# Patient Record
Sex: MALE | Race: WHITE | Employment: FULL TIME | URBAN - METROPOLITAN AREA
[De-identification: names, ages, dates, MRNs, and addresses within clinical notes are randomized per-mention and may not be internally consistent; named-entity substitution may affect disease eponyms.]

---

## 2017-08-17 DIAGNOSIS — F32.A ANXIETY AND DEPRESSION: ICD-10-CM

## 2017-08-17 DIAGNOSIS — F41.9 ANXIETY AND DEPRESSION: ICD-10-CM

## 2017-08-17 DIAGNOSIS — F32.A DEPRESSION: ICD-10-CM

## 2017-08-17 RX ORDER — ESCITALOPRAM OXALATE 20 MG/1
TABLET ORAL
Qty: 90 TABLET | Refills: 3 | Status: SHIPPED | OUTPATIENT
Start: 2017-08-17 | End: 2018-09-24 | Stop reason: SDUPTHER

## 2017-08-18 DIAGNOSIS — F32.A DEPRESSION: ICD-10-CM

## 2017-08-18 DIAGNOSIS — F41.9 ANXIETY AND DEPRESSION: ICD-10-CM

## 2017-08-18 DIAGNOSIS — F32.A ANXIETY AND DEPRESSION: ICD-10-CM

## 2017-08-18 RX ORDER — ESCITALOPRAM OXALATE 20 MG/1
TABLET ORAL
Qty: 90 TABLET | Refills: 3 | Status: SHIPPED | OUTPATIENT
Start: 2017-08-18 | End: 2017-09-26 | Stop reason: SDUPTHER

## 2017-09-14 ENCOUNTER — TELEPHONE (OUTPATIENT)
Dept: FAMILY MEDICINE CLINIC | Age: 37
End: 2017-09-14

## 2017-09-14 DIAGNOSIS — I10 ESSENTIAL HYPERTENSION: Primary | ICD-10-CM

## 2017-09-14 DIAGNOSIS — E78.5 HYPERLIPIDEMIA WITH TARGET LDL LESS THAN 130: ICD-10-CM

## 2017-09-14 DIAGNOSIS — Z51.81 MEDICATION MONITORING ENCOUNTER: ICD-10-CM

## 2017-09-20 ENCOUNTER — HOSPITAL ENCOUNTER (OUTPATIENT)
Age: 37
Discharge: HOME | End: 2017-09-20
Payer: COMMERCIAL

## 2017-09-20 DIAGNOSIS — E78.5 HYPERLIPIDEMIA WITH TARGET LDL LESS THAN 130: ICD-10-CM

## 2017-09-20 DIAGNOSIS — I10 ESSENTIAL HYPERTENSION: ICD-10-CM

## 2017-09-20 DIAGNOSIS — Z51.81 MEDICATION MONITORING ENCOUNTER: ICD-10-CM

## 2017-09-20 LAB
ALBUMIN SERPL-MCNC: 4.6 G/DL (ref 3.5–5.1)
ALP BLD-CCNC: 56 U/L (ref 38–126)
ALT SERPL-CCNC: 33 U/L (ref 11–66)
ANION GAP SERPL CALCULATED.3IONS-SCNC: 13 MEQ/L (ref 8–16)
AST SERPL-CCNC: 23 U/L (ref 5–40)
BILIRUB SERPL-MCNC: 0.4 MG/DL (ref 0.3–1.2)
BILIRUBIN URINE: NEGATIVE
BLOOD, URINE: NEGATIVE
BUN BLDV-MCNC: 15 MG/DL (ref 7–22)
CALCIUM SERPL-MCNC: 9.3 MG/DL (ref 8.5–10.5)
CHARACTER, URINE: CLEAR
CHLORIDE BLD-SCNC: 102 MEQ/L (ref 98–111)
CHOLESTEROL, TOTAL: 237 MG/DL (ref 100–199)
CO2: 24 MEQ/L (ref 23–33)
COLOR: YELLOW
CREAT SERPL-MCNC: 0.8 MG/DL (ref 0.4–1.2)
GFR SERPL CREATININE-BSD FRML MDRD: > 90 ML/MIN/1.73M2
GLUCOSE BLD-MCNC: 107 MG/DL (ref 70–108)
GLUCOSE, URINE: NEGATIVE MG/DL
HCT VFR BLD CALC: 45.2 % (ref 42–52)
HDLC SERPL-MCNC: 36 MG/DL
HEMOGLOBIN: 15.3 GM/DL (ref 14–18)
KETONES, URINE: NEGATIVE
LDL CHOLESTEROL CALCULATED: 135 MG/DL
LEUKOCYTE EST, POC: NEGATIVE
MCH RBC QN AUTO: 31 PG (ref 27–31)
MCHC RBC AUTO-ENTMCNC: 33.9 GM/DL (ref 33–37)
MCV RBC AUTO: 91.5 FL (ref 80–94)
NITRITE, URINE: NEGATIVE
PDW BLD-RTO: 13.1 % (ref 11.5–14.5)
PH UA: 7.5
PLATELET # BLD: 185 THOU/MM3 (ref 130–400)
PMV BLD AUTO: 8.9 MCM (ref 7.4–10.4)
POTASSIUM SERPL-SCNC: 4.3 MEQ/L (ref 3.5–5.2)
PROTEIN UA: NEGATIVE MG/DL
RBC # BLD: 4.94 MILL/MM3 (ref 4.7–6.1)
SODIUM BLD-SCNC: 139 MEQ/L (ref 135–145)
SPECIFIC GRAVITY UA: 1.02 (ref 1–1.03)
TOTAL PROTEIN: 7.2 G/DL (ref 6.1–8)
TRIGL SERPL-MCNC: 329 MG/DL (ref 0–199)
UROBILINOGEN, URINE: 0.2 EU/DL
WBC # BLD: 6.1 THOU/MM3 (ref 4.8–10.8)

## 2017-09-20 PROCEDURE — 81003 URINALYSIS AUTO W/O SCOPE: CPT

## 2017-09-20 PROCEDURE — 85027 COMPLETE CBC AUTOMATED: CPT

## 2017-09-20 PROCEDURE — 36415 COLL VENOUS BLD VENIPUNCTURE: CPT

## 2017-09-20 PROCEDURE — 80061 LIPID PANEL: CPT

## 2017-09-20 PROCEDURE — 80053 COMPREHEN METABOLIC PANEL: CPT

## 2017-09-26 ENCOUNTER — OFFICE VISIT (OUTPATIENT)
Dept: FAMILY MEDICINE CLINIC | Age: 37
End: 2017-09-26
Payer: COMMERCIAL

## 2017-09-26 VITALS
HEIGHT: 75 IN | DIASTOLIC BLOOD PRESSURE: 82 MMHG | BODY MASS INDEX: 32.03 KG/M2 | RESPIRATION RATE: 16 BRPM | SYSTOLIC BLOOD PRESSURE: 128 MMHG | HEART RATE: 68 BPM | OXYGEN SATURATION: 98 % | WEIGHT: 257.6 LBS

## 2017-09-26 DIAGNOSIS — E78.5 HYPERLIPIDEMIA WITH TARGET LDL LESS THAN 130: ICD-10-CM

## 2017-09-26 DIAGNOSIS — F33.8 SEASONAL AFFECTIVE DISORDER (HCC): ICD-10-CM

## 2017-09-26 DIAGNOSIS — F32.A ANXIETY AND DEPRESSION: ICD-10-CM

## 2017-09-26 DIAGNOSIS — K21.9 GASTROESOPHAGEAL REFLUX DISEASE WITHOUT ESOPHAGITIS: ICD-10-CM

## 2017-09-26 DIAGNOSIS — I10 ESSENTIAL HYPERTENSION: Primary | ICD-10-CM

## 2017-09-26 DIAGNOSIS — Z51.81 MEDICATION MONITORING ENCOUNTER: ICD-10-CM

## 2017-09-26 DIAGNOSIS — F41.9 ANXIETY AND DEPRESSION: ICD-10-CM

## 2017-09-26 DIAGNOSIS — J30.1 SEASONAL ALLERGIC RHINITIS DUE TO POLLEN: ICD-10-CM

## 2017-09-26 PROCEDURE — 99214 OFFICE O/P EST MOD 30 MIN: CPT | Performed by: FAMILY MEDICINE

## 2017-09-26 RX ORDER — RAMIPRIL 5 MG/1
5 CAPSULE ORAL DAILY
Qty: 90 CAPSULE | Refills: 3 | Status: SHIPPED | OUTPATIENT
Start: 2017-09-26 | End: 2018-09-24 | Stop reason: SDUPTHER

## 2017-09-26 RX ORDER — OMEPRAZOLE 20 MG/1
20 CAPSULE, DELAYED RELEASE ORAL DAILY
Qty: 90 CAPSULE | Refills: 3 | Status: SHIPPED | OUTPATIENT
Start: 2017-09-26 | End: 2018-09-24 | Stop reason: SDUPTHER

## 2017-09-26 RX ORDER — HYDROXYZINE HYDROCHLORIDE 25 MG/1
25 TABLET, FILM COATED ORAL EVERY 8 HOURS PRN
Qty: 30 TABLET | Refills: 2 | Status: SHIPPED | OUTPATIENT
Start: 2017-09-26 | End: 2018-09-18 | Stop reason: SDUPTHER

## 2017-09-26 ASSESSMENT — PATIENT HEALTH QUESTIONNAIRE - PHQ9
2. FEELING DOWN, DEPRESSED OR HOPELESS: 0
1. LITTLE INTEREST OR PLEASURE IN DOING THINGS: 0
SUM OF ALL RESPONSES TO PHQ QUESTIONS 1-9: 0
SUM OF ALL RESPONSES TO PHQ9 QUESTIONS 1 & 2: 0

## 2017-09-26 ASSESSMENT — ENCOUNTER SYMPTOMS
GASTROINTESTINAL NEGATIVE: 1
RHINORRHEA: 0
RESPIRATORY NEGATIVE: 1
SINUS PRESSURE: 0
FACIAL SWELLING: 0
COUGH: 0

## 2017-10-30 RX ORDER — OMEPRAZOLE 20 MG/1
CAPSULE, DELAYED RELEASE ORAL
Qty: 90 CAPSULE | Refills: 3 | Status: SHIPPED | OUTPATIENT
Start: 2017-10-30 | End: 2018-09-24 | Stop reason: SDUPTHER

## 2018-05-06 ENCOUNTER — HOSPITAL ENCOUNTER (EMERGENCY)
Age: 38
Discharge: HOME | End: 2018-05-06
Payer: COMMERCIAL

## 2018-05-06 VITALS
WEIGHT: 280 LBS | HEART RATE: 65 BPM | HEIGHT: 75 IN | RESPIRATION RATE: 20 BRPM | BODY MASS INDEX: 34.82 KG/M2 | TEMPERATURE: 98 F | OXYGEN SATURATION: 98 % | DIASTOLIC BLOOD PRESSURE: 70 MMHG | SYSTOLIC BLOOD PRESSURE: 137 MMHG

## 2018-05-06 DIAGNOSIS — M79.5 FOREIGN BODY (FB) IN SOFT TISSUE: Primary | ICD-10-CM

## 2018-05-06 PROCEDURE — 6370000000 HC RX 637 (ALT 250 FOR IP): Performed by: PHYSICIAN ASSISTANT

## 2018-05-06 PROCEDURE — 99282 EMERGENCY DEPT VISIT SF MDM: CPT

## 2018-05-06 PROCEDURE — 90715 TDAP VACCINE 7 YRS/> IM: CPT | Performed by: PHYSICIAN ASSISTANT

## 2018-05-06 PROCEDURE — 6360000002 HC RX W HCPCS: Performed by: PHYSICIAN ASSISTANT

## 2018-05-06 PROCEDURE — 90471 IMMUNIZATION ADMIN: CPT | Performed by: PHYSICIAN ASSISTANT

## 2018-05-06 RX ORDER — CEPHALEXIN 500 MG/1
500 CAPSULE ORAL 4 TIMES DAILY
Qty: 28 CAPSULE | Refills: 0 | Status: SHIPPED | OUTPATIENT
Start: 2018-05-06 | End: 2018-05-13

## 2018-05-06 RX ORDER — IBUPROFEN 200 MG
TABLET ORAL ONCE
Status: COMPLETED | OUTPATIENT
Start: 2018-05-06 | End: 2018-05-06

## 2018-05-06 RX ORDER — LIDOCAINE HYDROCHLORIDE 10 MG/ML
INJECTION, SOLUTION INFILTRATION; PERINEURAL
Status: DISCONTINUED
Start: 2018-05-06 | End: 2018-05-06 | Stop reason: HOSPADM

## 2018-05-06 RX ADMIN — NEOMYCIN AND POLYMYXIN B SULFATES AND BACITRACIN ZINC: 400; 3.5; 5 OINTMENT TOPICAL at 15:34

## 2018-05-06 RX ADMIN — TETANUS TOXOID, REDUCED DIPHTHERIA TOXOID AND ACELLULAR PERTUSSIS VACCINE, ADSORBED 0.5 ML: 5; 2.5; 8; 8; 2.5 SUSPENSION INTRAMUSCULAR at 15:30

## 2018-05-06 ASSESSMENT — ENCOUNTER SYMPTOMS
NAUSEA: 0
VOMITING: 0
DIARRHEA: 0
BACK PAIN: 0
ABDOMINAL PAIN: 0
WHEEZING: 0
EYE REDNESS: 0
COUGH: 0
SHORTNESS OF BREATH: 0
SORE THROAT: 0
RHINORRHEA: 0
EYE DISCHARGE: 0

## 2018-05-06 ASSESSMENT — PAIN DESCRIPTION - PAIN TYPE: TYPE: ACUTE PAIN

## 2018-05-06 ASSESSMENT — PAIN DESCRIPTION - ORIENTATION: ORIENTATION: RIGHT

## 2018-05-06 ASSESSMENT — PAIN DESCRIPTION - DESCRIPTORS: DESCRIPTORS: ACHING

## 2018-05-06 ASSESSMENT — PAIN SCALES - GENERAL: PAINLEVEL_OUTOF10: 2

## 2018-09-19 RX ORDER — HYDROXYZINE HYDROCHLORIDE 25 MG/1
25 TABLET, FILM COATED ORAL EVERY 8 HOURS PRN
Qty: 30 TABLET | Refills: 2 | Status: SHIPPED | OUTPATIENT
Start: 2018-09-19 | End: 2022-04-05 | Stop reason: SDUPTHER

## 2018-09-22 DIAGNOSIS — F32.A DEPRESSION: ICD-10-CM

## 2018-09-22 DIAGNOSIS — F41.9 ANXIETY AND DEPRESSION: ICD-10-CM

## 2018-09-22 DIAGNOSIS — F32.A ANXIETY AND DEPRESSION: ICD-10-CM

## 2018-09-24 ENCOUNTER — OFFICE VISIT (OUTPATIENT)
Dept: FAMILY MEDICINE CLINIC | Age: 38
End: 2018-09-24
Payer: COMMERCIAL

## 2018-09-24 VITALS
SYSTOLIC BLOOD PRESSURE: 124 MMHG | HEART RATE: 76 BPM | WEIGHT: 272.9 LBS | RESPIRATION RATE: 16 BRPM | OXYGEN SATURATION: 99 % | DIASTOLIC BLOOD PRESSURE: 76 MMHG | HEIGHT: 74 IN | BODY MASS INDEX: 35.02 KG/M2

## 2018-09-24 DIAGNOSIS — E78.5 HYPERLIPIDEMIA WITH TARGET LDL LESS THAN 130: ICD-10-CM

## 2018-09-24 DIAGNOSIS — J30.1 SEASONAL ALLERGIC RHINITIS DUE TO POLLEN: ICD-10-CM

## 2018-09-24 DIAGNOSIS — I10 ESSENTIAL HYPERTENSION: Primary | ICD-10-CM

## 2018-09-24 DIAGNOSIS — K21.9 GASTROESOPHAGEAL REFLUX DISEASE WITHOUT ESOPHAGITIS: ICD-10-CM

## 2018-09-24 DIAGNOSIS — F32.A ANXIETY AND DEPRESSION: ICD-10-CM

## 2018-09-24 DIAGNOSIS — F41.9 ANXIETY AND DEPRESSION: ICD-10-CM

## 2018-09-24 DIAGNOSIS — F33.8 SEASONAL AFFECTIVE DISORDER (HCC): ICD-10-CM

## 2018-09-24 DIAGNOSIS — Z51.81 MEDICATION MONITORING ENCOUNTER: ICD-10-CM

## 2018-09-24 PROCEDURE — 99214 OFFICE O/P EST MOD 30 MIN: CPT | Performed by: FAMILY MEDICINE

## 2018-09-24 RX ORDER — ESCITALOPRAM OXALATE 20 MG/1
20 TABLET ORAL DAILY
Qty: 90 TABLET | Refills: 3 | Status: SHIPPED | OUTPATIENT
Start: 2018-09-24 | End: 2019-08-08 | Stop reason: SDUPTHER

## 2018-09-24 RX ORDER — ESCITALOPRAM OXALATE 20 MG/1
TABLET ORAL
Qty: 90 TABLET | Refills: 0 | Status: SHIPPED | OUTPATIENT
Start: 2018-09-24 | End: 2018-09-24 | Stop reason: SDUPTHER

## 2018-09-24 RX ORDER — OMEPRAZOLE 20 MG/1
20 CAPSULE, DELAYED RELEASE ORAL DAILY
Qty: 90 CAPSULE | Refills: 3 | Status: SHIPPED | OUTPATIENT
Start: 2018-09-24 | End: 2019-08-08 | Stop reason: SDUPTHER

## 2018-09-24 RX ORDER — RAMIPRIL 5 MG/1
5 CAPSULE ORAL DAILY
Qty: 90 CAPSULE | Refills: 3 | Status: SHIPPED | OUTPATIENT
Start: 2018-09-24 | End: 2019-08-08 | Stop reason: SDUPTHER

## 2018-09-24 ASSESSMENT — ENCOUNTER SYMPTOMS
GASTROINTESTINAL NEGATIVE: 1
COUGH: 0
ALLERGIC/IMMUNOLOGIC NEGATIVE: 1
RESPIRATORY NEGATIVE: 1

## 2018-09-24 NOTE — PROGRESS NOTES
Chronic Disease Visit Information    BP Readings from Last 3 Encounters:   05/06/18 137/70   09/26/17 128/82   05/24/16 122/66          Hemoglobin A1C (%)   Date Value   09/30/2013 5.7     LDL Calculated (mg/dL)   Date Value   09/20/2017 135     HDL (mg/dL)   Date Value   09/20/2017 36     BUN (mg/dL)   Date Value   09/20/2017 15     CREATININE (mg/dL)   Date Value   09/20/2017 0.8     Glucose (mg/dL)   Date Value   09/20/2017 107            Have you changed or started any medications since your last visit including any over-the-counter medicines, vitamins, or herbal medicines? Yes, medication list updated  Are you having any side effects from any of your medications? -  no  Have you stopped taking any of your medications? Is so, why? -  no    Have you seen any other physician or provider since your last visit? No  Have you had any other diagnostic tests since your last visit? No  Have you been seen in the emergency room and/or had an admission to a hospital since we last saw you? Yes - Records Obtained  Have you had your annual diabetic retinal (eye) exam? No  Have you had your routine dental cleaning in the past 6 months? no    Have you activated your [employer] account? If not, what are your barriers?  No: not at this time     Patient Care Team:  Sharyle Settle, MD as PCP - General (Family Medicine)         Medical History Review  Past Medical, Family, and Social History reviewed and does not contribute to the patient presenting condition    Health Maintenance   Topic Date Due    HIV screen  12/03/1995    A1C test (Diabetic or Prediabetic)  09/30/2014    Flu vaccine (1) 09/01/2018    Potassium monitoring  09/20/2018    Creatinine monitoring  09/20/2018    DTaP/Tdap/Td vaccine (2 - Td) 05/06/2028

## 2018-09-24 NOTE — PROGRESS NOTES
Subjective:      Patient ID: Shannon Munson is a 40 y.o. male. HPI  Follow up of chronic conditions. Encounter Diagnoses   Name Primary?  Anxiety and depression     Essential hypertension Yes    Gastroesophageal reflux disease without esophagitis     Seasonal allergic rhinitis due to pollen     Medication monitoring encounter     Hyperlipidemia with target LDL less than 130     Seasonal affective disorder (HCC)      HTN is stable with current medications. Pt has no medication side effects nor orthostatic symptoms. BP Readings from Last 3 Encounters:   09/24/18 124/76   05/06/18 137/70   09/26/17 128/82     His gastroesophageal reflux still bothers him. He is trying to wean himself off of his Prilosec because of the adverse potential side effects with long-term use. .  We discussed several options of trying to get him onto an H2 blocker with gradual tapering of the Prilosec going to either Pepcid or Zantac over-the-counter. He realizes that those aren't necessarily completely benign but is hopeful that they will decrease the chances of cardiac, liver or renal injury. He has also found that there are certain foods and beverages that he is having to stop using and has found that soft drinks and pop definitely aggravate his gastroesophageal reflux. The rationale behind stopping them including diet free sodas was discussed. His mood has improved since he is decided to do more activities outdoors. He is now hunting and fishing routinely and is starting to engage in winter sports. This has improved his seasonal affective disorder markedly and he plans on doing this indefinitely. His seasonal allergies were not as severe this year as previous years. He has only had to use over-the-counter antihistamines as needed based upon the severity of symptoms.   He is taking his montelukast asked as needed and through the summer months, he was at least taking loratadine on a regular basis for the mid release capsule Take 1 capsule by mouth Daily 90 capsule 3    escitalopram (LEXAPRO) 20 MG tablet Take 1 tablet by mouth daily 90 tablet 3    hydrOXYzine (ATARAX) 25 MG tablet TAKE 1 TABLET BY MOUTH EVERY 8 HOURS AS NEEDED FOR ITCHING 30 tablet 2    loratadine (CLARITIN) 10 MG tablet Take 10 mg by mouth daily. No current facility-administered medications for this visit. Continue present medications. Try changing to Pepcid or Zantac from the Prilosec as able. Continue outdoor activities to help mood. Jacinta Nunez received counseling on the following healthy behaviors: nutrition and exercise  Reviewed prior labs and health maintenance  Continue current medications, diet and exercise. Discussed use, benefit, and side effects of prescribed medications. Barriers to medication compliance addressed. Patient given educational materials - see patient instructions  Was a self-tracking handout given in paper form or via [employer]? Yes    Requested Prescriptions     Signed Prescriptions Disp Refills    ramipril (ALTACE) 5 MG capsule 90 capsule 3     Sig: Take 1 capsule by mouth daily    omeprazole (PRILOSEC) 20 MG delayed release capsule 90 capsule 3     Sig: Take 1 capsule by mouth Daily    escitalopram (LEXAPRO) 20 MG tablet 90 tablet 3     Sig: Take 1 tablet by mouth daily       All patient questions answered. Patient voiced understanding. Quality Measures    Body mass index is 35.04 kg/m². Elevated. Weight control planned discussed Healthy diet and regular exercise. BP: 124/76 Blood pressure is normal. Treatment plan consists of No treatment change needed.     Lab Results   Component Value Date    LDLCALC 135 09/20/2017    (goal LDL reduction with dx if diabetes is 50% LDL reduction)      PHQ Scores 9/26/2017   PHQ2 Score 0   PHQ9 Score 0     Interpretation of Total Score Depression Severity: 1-4 = Minimal depression, 5-9 = Mild depression, 10-14 = Moderate depression, 15-19 = Moderately severe depression, 20-27 = Severe depression          Carol Mcdonald MD

## 2018-09-25 ENCOUNTER — TELEPHONE (OUTPATIENT)
Dept: FAMILY MEDICINE CLINIC | Age: 38
End: 2018-09-25

## 2019-08-08 ENCOUNTER — OFFICE VISIT (OUTPATIENT)
Dept: FAMILY MEDICINE CLINIC | Age: 39
End: 2019-08-08
Payer: COMMERCIAL

## 2019-08-08 VITALS
BODY MASS INDEX: 33.33 KG/M2 | HEART RATE: 67 BPM | WEIGHT: 259.6 LBS | OXYGEN SATURATION: 98 % | DIASTOLIC BLOOD PRESSURE: 74 MMHG | RESPIRATION RATE: 13 BRPM | SYSTOLIC BLOOD PRESSURE: 116 MMHG

## 2019-08-08 DIAGNOSIS — B07.8 PERIUNGUAL WART: ICD-10-CM

## 2019-08-08 DIAGNOSIS — F32.A ANXIETY AND DEPRESSION: ICD-10-CM

## 2019-08-08 DIAGNOSIS — Z00.00 WELL ADULT EXAM: Primary | ICD-10-CM

## 2019-08-08 DIAGNOSIS — F41.9 ANXIETY AND DEPRESSION: ICD-10-CM

## 2019-08-08 DIAGNOSIS — I10 ESSENTIAL HYPERTENSION: ICD-10-CM

## 2019-08-08 DIAGNOSIS — L84 CALLUS OF PERIWOUND SKIN: ICD-10-CM

## 2019-08-08 DIAGNOSIS — K21.9 GASTROESOPHAGEAL REFLUX DISEASE WITHOUT ESOPHAGITIS: ICD-10-CM

## 2019-08-08 PROCEDURE — 99395 PREV VISIT EST AGE 18-39: CPT | Performed by: NURSE PRACTITIONER

## 2019-08-08 RX ORDER — ESCITALOPRAM OXALATE 20 MG/1
20 TABLET ORAL DAILY
Qty: 90 TABLET | Refills: 3 | Status: SHIPPED | OUTPATIENT
Start: 2019-08-08 | End: 2020-08-18 | Stop reason: SDUPTHER

## 2019-08-08 RX ORDER — OMEPRAZOLE 20 MG/1
20 CAPSULE, DELAYED RELEASE ORAL DAILY
Qty: 90 CAPSULE | Refills: 3 | Status: SHIPPED | OUTPATIENT
Start: 2019-08-08 | End: 2020-08-18 | Stop reason: SDUPTHER

## 2019-08-08 RX ORDER — RAMIPRIL 5 MG/1
5 CAPSULE ORAL DAILY
Qty: 90 CAPSULE | Refills: 3 | Status: SHIPPED | OUTPATIENT
Start: 2019-08-08 | End: 2020-08-18 | Stop reason: SDUPTHER

## 2019-08-08 RX ORDER — IMIQUIMOD 12.5 MG/.25G
CREAM TOPICAL
Qty: 24 EACH | Refills: 0 | Status: SHIPPED | OUTPATIENT
Start: 2019-08-08 | End: 2019-08-15

## 2019-08-08 ASSESSMENT — ENCOUNTER SYMPTOMS
SHORTNESS OF BREATH: 0
SORE THROAT: 0
COUGH: 0

## 2019-09-07 LAB
CHOLESTEROL, TOTAL: 219 MG/DL
CHOLESTEROL/HDL RATIO: NORMAL
HDLC SERPL-MCNC: 39 MG/DL (ref 35–70)
LDL CHOLESTEROL CALCULATED: 143 MG/DL (ref 0–160)
TRIGL SERPL-MCNC: 184 MG/DL
VLDLC SERPL CALC-MCNC: 37 MG/DL

## 2019-11-04 ENCOUNTER — OFFICE VISIT (OUTPATIENT)
Dept: FAMILY MEDICINE CLINIC | Age: 39
End: 2019-11-04
Payer: COMMERCIAL

## 2019-11-04 VITALS
HEIGHT: 75 IN | RESPIRATION RATE: 18 BRPM | SYSTOLIC BLOOD PRESSURE: 116 MMHG | HEART RATE: 68 BPM | WEIGHT: 257.2 LBS | BODY MASS INDEX: 31.98 KG/M2 | DIASTOLIC BLOOD PRESSURE: 78 MMHG

## 2019-11-04 DIAGNOSIS — M75.22 BICEPS TENDINITIS OF LEFT UPPER EXTREMITY: Primary | ICD-10-CM

## 2019-11-04 PROCEDURE — 99213 OFFICE O/P EST LOW 20 MIN: CPT | Performed by: FAMILY MEDICINE

## 2019-11-04 RX ORDER — MELOXICAM 15 MG/1
15 TABLET ORAL DAILY
Qty: 30 TABLET | Refills: 3 | Status: SHIPPED | OUTPATIENT
Start: 2019-11-04 | End: 2020-03-06

## 2020-03-06 RX ORDER — MELOXICAM 15 MG/1
TABLET ORAL
Qty: 30 TABLET | Refills: 3 | Status: SHIPPED | OUTPATIENT
Start: 2020-03-06 | End: 2020-07-14 | Stop reason: ALTCHOICE

## 2020-07-14 ENCOUNTER — TELEPHONE (OUTPATIENT)
Dept: FAMILY MEDICINE CLINIC | Age: 40
End: 2020-07-14

## 2020-07-14 ENCOUNTER — OFFICE VISIT (OUTPATIENT)
Dept: FAMILY MEDICINE CLINIC | Age: 40
End: 2020-07-14
Payer: COMMERCIAL

## 2020-07-14 VITALS
SYSTOLIC BLOOD PRESSURE: 126 MMHG | OXYGEN SATURATION: 98 % | DIASTOLIC BLOOD PRESSURE: 63 MMHG | WEIGHT: 247.8 LBS | RESPIRATION RATE: 16 BRPM | TEMPERATURE: 97.7 F | BODY MASS INDEX: 31.8 KG/M2 | HEART RATE: 64 BPM | HEIGHT: 74 IN

## 2020-07-14 PROCEDURE — 99213 OFFICE O/P EST LOW 20 MIN: CPT | Performed by: FAMILY MEDICINE

## 2020-07-14 RX ORDER — NAPROXEN 500 MG/1
500 TABLET ORAL 2 TIMES DAILY WITH MEALS
Qty: 30 TABLET | Refills: 0 | Status: SHIPPED | OUTPATIENT
Start: 2020-07-14 | End: 2021-03-09

## 2020-07-14 ASSESSMENT — ENCOUNTER SYMPTOMS
GASTROINTESTINAL NEGATIVE: 1
RESPIRATORY NEGATIVE: 1

## 2020-07-14 NOTE — TELEPHONE ENCOUNTER
Pt is scheduled for a physical on 8/18/20. He is requesting routine lab orders, including a PSA. Please mail orders to pt prior to his appt.

## 2020-07-14 NOTE — PATIENT INSTRUCTIONS
You may receive a survey about your visit with us today. The feedback from our patients helps us identify what is working well and where the service to all patients can be enhanced. Thank you! Patient Education        Epididymitis and Orchitis: Care Instructions  Your Care Instructions     Epididymitis is pain and swelling of the tube that is attached to each testicle. This tube is called the epididymis. Orchitis is pain and swelling of the testicle. Infection with bacteria often causes these conditions. Sexually transmitted infections (STIs) also can cause both conditions. This is often the case in men younger than 28. Other causes in boys and older men are infections from surgery or having a catheter that drains urine. The mumps virus also can cause orchitis. Anti-inflammatory or pain medicines can help with the pain. Antibiotics are used if the problem is caused by bacteria. They are not used if a virus is the cause. Your testicle may stay swollen for many days or even a few weeks. The doctor has checked you carefully, but problems can develop later. If you notice any problems or new symptoms, get medical treatment right away. Follow-up care is a key part of your treatment and safety. Be sure to make and go to all appointments, and call your doctor if you are having problems. It's also a good idea to know your test results and keep a list of the medicines you take. How can you care for yourself at home? · If your doctor prescribed antibiotics, take them as directed. Do not stop taking them just because you feel better. You need to take the full course of antibiotics. · Ask your doctor if you can take an over-the-counter pain medicine, such as acetaminophen (Tylenol), ibuprofen (Advil, Motrin), or naproxen (Aleve). Be safe with medicines. Read and follow all instructions on the label. · Limit your activity to what is comfortable. · Wear snug underwear or an athletic supporter.  This can help reduce

## 2020-07-14 NOTE — PROGRESS NOTES
reviewed and are negative. Objective:   Physical Exam  Vitals signs and nursing note reviewed. Constitutional:       Appearance: He is well-developed. HENT:      Head: Normocephalic and atraumatic. Right Ear: Tympanic membrane normal.      Left Ear: Tympanic membrane normal.   Cardiovascular:      Rate and Rhythm: Normal rate and regular rhythm. Heart sounds: Normal heart sounds. No murmur. Pulmonary:      Effort: Pulmonary effort is normal.      Breath sounds: Normal breath sounds. Abdominal:      General: Bowel sounds are normal.      Palpations: Abdomen is soft. Hernia: There is no hernia in the left inguinal area or right inguinal area. Genitourinary:     Pubic Area: No rash. Penis: Normal and circumcised. Scrotum/Testes:         Right: Mass, tenderness, testicular hydrocele or varicocele not present. Left: Mass, tenderness, testicular hydrocele or varicocele not present. Epididymis:      Right: Inflamed. Tenderness present. Lymphadenopathy:      Lower Body: No right inguinal adenopathy. No left inguinal adenopathy. Skin:     General: Skin is warm and dry. Neurological:      Mental Status: He is alert and oriented to person, place, and time. Psychiatric:         Behavior: Behavior normal.         Assessment:       Diagnosis Orders   1.  Epididymitis, right  naproxen (NAPROSYN) 500 MG tablet           Plan:      -  Naproxen BID  -  Supportive underwear  -  RTO prn        Viet Walton DO

## 2020-08-18 ENCOUNTER — OFFICE VISIT (OUTPATIENT)
Dept: FAMILY MEDICINE CLINIC | Age: 40
End: 2020-08-18
Payer: COMMERCIAL

## 2020-08-18 ENCOUNTER — NURSE ONLY (OUTPATIENT)
Dept: LAB | Age: 40
End: 2020-08-18

## 2020-08-18 VITALS
HEART RATE: 80 BPM | WEIGHT: 243.7 LBS | TEMPERATURE: 97.1 F | SYSTOLIC BLOOD PRESSURE: 130 MMHG | DIASTOLIC BLOOD PRESSURE: 74 MMHG | HEIGHT: 75 IN | BODY MASS INDEX: 30.3 KG/M2 | RESPIRATION RATE: 16 BRPM

## 2020-08-18 LAB
ALBUMIN SERPL-MCNC: 4.7 G/DL (ref 3.5–5.1)
ALP BLD-CCNC: 46 U/L (ref 38–126)
ALT SERPL-CCNC: 21 U/L (ref 11–66)
ANION GAP SERPL CALCULATED.3IONS-SCNC: 13 MEQ/L (ref 8–16)
AST SERPL-CCNC: 21 U/L (ref 5–40)
BASOPHILS # BLD: 0.6 %
BASOPHILS ABSOLUTE: 0 THOU/MM3 (ref 0–0.1)
BILIRUB SERPL-MCNC: 0.4 MG/DL (ref 0.3–1.2)
BUN BLDV-MCNC: 23 MG/DL (ref 7–22)
CALCIUM SERPL-MCNC: 9.5 MG/DL (ref 8.5–10.5)
CHLORIDE BLD-SCNC: 106 MEQ/L (ref 98–111)
CHOLESTEROL, FASTING: 216 MG/DL (ref 100–199)
CO2: 22 MEQ/L (ref 23–33)
CREAT SERPL-MCNC: 0.8 MG/DL (ref 0.4–1.2)
EOSINOPHIL # BLD: 1.1 %
EOSINOPHILS ABSOLUTE: 0.1 THOU/MM3 (ref 0–0.4)
ERYTHROCYTE [DISTWIDTH] IN BLOOD BY AUTOMATED COUNT: 12.6 % (ref 11.5–14.5)
ERYTHROCYTE [DISTWIDTH] IN BLOOD BY AUTOMATED COUNT: 45.1 FL (ref 35–45)
GFR SERPL CREATININE-BSD FRML MDRD: > 90 ML/MIN/1.73M2
GLUCOSE FASTING: 106 MG/DL (ref 70–108)
HCT VFR BLD CALC: 44.8 % (ref 42–52)
HDLC SERPL-MCNC: 36 MG/DL
HEMOGLOBIN: 14.9 GM/DL (ref 14–18)
IMMATURE GRANS (ABS): 0.02 THOU/MM3 (ref 0–0.07)
IMMATURE GRANULOCYTES: 0.3 %
LDL CHOLESTEROL CALCULATED: 156 MG/DL
LYMPHOCYTES # BLD: 26 %
LYMPHOCYTES ABSOLUTE: 1.8 THOU/MM3 (ref 1–4.8)
MCH RBC QN AUTO: 32.2 PG (ref 26–33)
MCHC RBC AUTO-ENTMCNC: 33.3 GM/DL (ref 32.2–35.5)
MCV RBC AUTO: 96.8 FL (ref 80–94)
MONOCYTES # BLD: 7.9 %
MONOCYTES ABSOLUTE: 0.6 THOU/MM3 (ref 0.4–1.3)
NUCLEATED RED BLOOD CELLS: 0 /100 WBC
PLATELET # BLD: 206 THOU/MM3 (ref 130–400)
PMV BLD AUTO: 11.2 FL (ref 9.4–12.4)
POTASSIUM SERPL-SCNC: 3.8 MEQ/L (ref 3.5–5.2)
PROSTATE SPECIFIC ANTIGEN: 0.51 NG/ML (ref 0–1)
RBC # BLD: 4.63 MILL/MM3 (ref 4.7–6.1)
SEG NEUTROPHILS: 64.1 %
SEGMENTED NEUTROPHILS ABSOLUTE COUNT: 4.5 THOU/MM3 (ref 1.8–7.7)
SODIUM BLD-SCNC: 141 MEQ/L (ref 135–145)
TOTAL PROTEIN: 7.2 G/DL (ref 6.1–8)
TRIGLYCERIDE, FASTING: 119 MG/DL (ref 0–199)
WBC # BLD: 7 THOU/MM3 (ref 4.8–10.8)

## 2020-08-18 PROCEDURE — 99214 OFFICE O/P EST MOD 30 MIN: CPT | Performed by: FAMILY MEDICINE

## 2020-08-18 RX ORDER — RAMIPRIL 5 MG/1
5 CAPSULE ORAL DAILY
Qty: 90 CAPSULE | Refills: 3 | Status: SHIPPED | OUTPATIENT
Start: 2020-08-18 | End: 2021-08-24 | Stop reason: SDUPTHER

## 2020-08-18 RX ORDER — OMEPRAZOLE 20 MG/1
20 CAPSULE, DELAYED RELEASE ORAL DAILY
Qty: 90 CAPSULE | Refills: 3 | Status: SHIPPED | OUTPATIENT
Start: 2020-08-18 | End: 2020-09-15 | Stop reason: ALTCHOICE

## 2020-08-18 RX ORDER — DOXYCYCLINE HYCLATE 100 MG
100 TABLET ORAL 2 TIMES DAILY
Qty: 20 TABLET | Refills: 0 | Status: SHIPPED | OUTPATIENT
Start: 2020-08-18 | End: 2020-08-28

## 2020-08-18 RX ORDER — ESCITALOPRAM OXALATE 20 MG/1
20 TABLET ORAL DAILY
Qty: 90 TABLET | Refills: 3 | Status: SHIPPED | OUTPATIENT
Start: 2020-08-18 | End: 2020-10-05

## 2020-08-18 ASSESSMENT — ENCOUNTER SYMPTOMS
GASTROINTESTINAL NEGATIVE: 1
ALLERGIC/IMMUNOLOGIC NEGATIVE: 1
RESPIRATORY NEGATIVE: 1

## 2020-08-18 NOTE — LETTER
[address] [address]  Phone: [phone]  Fax: [phone]    Petra Narayanan MD        August 18, 2020    [address] [address]      To whom it may concern,    Tiffany Reyes continues to do well on his current blood pressure medications as well as his meds for depression. 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 If you have any questions or concerns, please don't hesitate to call.     Sincerely,        Petra Narayanan MD

## 2020-08-18 NOTE — PROGRESS NOTES
Subjective:      Patient ID: Cindy Bahena is a 44 y.o. male. HPI    Encounter Diagnoses   Name Primary?  Anxiety and depression     Gastroesophageal reflux disease without esophagitis     Essential hypertension Yes    Hyperlipidemia with target LDL less than 130     Medication monitoring encounter     Epididymitis, right      HTN is stable with current medications. Pt has no medication side effects nor orthostatic symptoms. BP Readings from Last 3 Encounters:   08/18/20 130/74   07/14/20 126/63   11/04/19 116/78     Lipid values continue to be good without statin therapy. Cholesterol, Total (mg/dL)   Date Value   09/07/2019 219     HDL (mg/dL)   Date Value   08/18/2020 36     Triglycerides (mg/dL)   Date Value   09/07/2019 184     Lab Results   Component Value Date    LDLCALC 156 08/18/2020     His anxiety/depression continues to respond well to Lexapro 20 mg daily. He has no excessive daytime drowsiness or cognitive changes associated with use of the Lexapro. Gastroesophageal reflux continues to respond to omeprazole 20 mg daily. He has no breakthrough symptoms of reflux day or night. He continues to deal with some mild discomfort in the right testicle posterior aspect associated with epididymitis diagnosed several weeks ago. This seemed to have started after being struck in the groin by a hard \"wiffle ball\" during a game. The aching has not subsided even after using anti-inflammatory agents. He denies dysuria hematuria or pyuria and is simply bothered by the persistent slight swelling and aching of the epididymis. Because of the longevity of the symptoms, I will empirically start him on doxycycline continuing with his anti-inflammatory agents. The rest of this patient's conditions are stable. Past medical and surgical hx reviewed. No past medical history on file. No past surgical history on file.   Portions of this note were completed with a voice recording program. Efforts were made to edit the dictations but occasionally words are mis-transcribed. Review of Systems   Constitutional: Negative. HENT: Negative. Respiratory: Negative. Cardiovascular: Negative. Gastrointestinal: Negative. Genitourinary: Negative. Musculoskeletal: Negative. Allergic/Immunologic: Negative. Neurological: Negative. Hematological: Negative. All other systems reviewed and are negative. Objective:   Physical Exam  Vitals signs and nursing note reviewed. HENT:      Right Ear: Tympanic membrane, ear canal and external ear normal.      Left Ear: Tympanic membrane, ear canal and external ear normal.      Mouth/Throat:      Mouth: Mucous membranes are moist.      Pharynx: Oropharynx is clear. Eyes:      Conjunctiva/sclera: Conjunctivae normal.      Pupils: Pupils are equal, round, and reactive to light. Neck:      Vascular: No carotid bruit. Cardiovascular:      Rate and Rhythm: Normal rate and regular rhythm. Pulses: Normal pulses. Heart sounds: Normal heart sounds. Pulmonary:      Effort: Pulmonary effort is normal.      Breath sounds: Normal breath sounds. Abdominal:      General: Bowel sounds are normal.      Hernia: There is no hernia in the left inguinal area or right inguinal area. Genitourinary:     Penis: Normal and circumcised. Scrotum/Testes: Normal.         Right: Mass not present. Left: Mass not present. Epididymis:      Right: Tenderness present. Left: Normal.   Musculoskeletal: Normal range of motion. Lymphadenopathy:      Lower Body: No right inguinal adenopathy. No left inguinal adenopathy. Skin:     General: Skin is warm and dry. Capillary Refill: Capillary refill takes less than 2 seconds. Neurological:      General: No focal deficit present. Mental Status: He is alert and oriented to person, place, and time.    Psychiatric:         Attention and Perception: Attention normal.         Mood and Affect: Mood normal.         Speech: Speech normal.         Behavior: Behavior normal.         Assessment:       Diagnosis Orders   1. Essential hypertension  ramipril (ALTACE) 5 MG capsule   2. Anxiety and depression  escitalopram (LEXAPRO) 20 MG tablet   3. Gastroesophageal reflux disease without esophagitis  omeprazole (PRILOSEC) 20 MG delayed release capsule   4. Hyperlipidemia with target LDL less than 130     5. Medication monitoring encounter     6. Epididymitis, right  doxycycline hyclate (VIBRA-TABS) 100 MG tablet           Plan:      No orders of the defined types were placed in this encounter. Medications Discontinued During This Encounter   Medication Reason    escitalopram (LEXAPRO) 20 MG tablet REORDER    omeprazole (PRILOSEC) 20 MG delayed release capsule REORDER    ramipril (ALTACE) 5 MG capsule REORDER     Current Outpatient Medications   Medication Sig Dispense Refill    escitalopram (LEXAPRO) 20 MG tablet Take 1 tablet by mouth daily 90 tablet 3    omeprazole (PRILOSEC) 20 MG delayed release capsule Take 1 capsule by mouth Daily 90 capsule 3    ramipril (ALTACE) 5 MG capsule Take 1 capsule by mouth daily 90 capsule 3    doxycycline hyclate (VIBRA-TABS) 100 MG tablet Take 1 tablet by mouth 2 times daily for 10 days 20 tablet 0    naproxen (NAPROSYN) 500 MG tablet Take 1 tablet by mouth 2 times daily (with meals) 30 tablet 0    Montelukast Sodium (SINGULAIR PO) Take by mouth As needed      loratadine (CLARITIN) 10 MG tablet Take 10 mg by mouth daily As needed       No current facility-administered medications for this visit. Continue present medications. Start the antibiotic today. Continue mobic for inflammation. If testicle pain is persistent after the antibiotics, call office for US of testicles.     Coby Monzon received counseling on the following healthy behaviors: nutrition    Patient given educational materials on Hypertension    I have instructed Coby Monzon to complete a self tracking handout on Blood Pressures  and instructed them to bring it with them to his next appointment. Discussed use, benefit, and side effects of prescribed medications. Barriers to medication compliance addressed. All patient questions answered. Pt voiced understanding.                  Juan C Blake MD

## 2020-08-18 NOTE — PATIENT INSTRUCTIONS
You may receive a survey about your visit with us today. The feedback from our patients helps us identify what is working well and where the service to all patients can be enhanced. Thank you! Continue present medications. Start the antibiotic today. Continue mobic for inflammation. If testicle pain is persistent after the antibiotics, call office for US of testicles. Patient Education        Learning About High Blood Pressure  What is high blood pressure? Blood pressure is a measure of how hard the blood pushes against the walls of your arteries. It's normal for blood pressure to go up and down throughout the day. But if it stays up, you have high blood pressure. Another name for high blood pressure is hypertension. Two numbers tell you your blood pressure. The first number is the systolic pressure (top number). It shows how hard the blood pushes when your heart is pumping. The second number is the diastolic pressure (bottom number). It shows how hard the blood pushes between heartbeats, when your heart is relaxed and filling with blood. Your doctor will give you a goal for your blood pressure based on your health and your age. High blood pressure (hypertension) means that the top number stays high, or the bottom number stays high, or both. High blood pressure increases the risk of stroke, heart attack, and other problems. What happens when you have high blood pressure? · Blood flows through your arteries with too much force. Over time, this damages the walls of your arteries. But you can't feel it. High blood pressure usually doesn't cause symptoms. · Fat and calcium start to build up in your arteries. This buildup is called plaque. Plaque makes your arteries narrower and stiffer. Blood can't flow through them as easily. · This lack of good blood flow starts to damage some of the organs in your body.  This can lead to problems such as coronary artery disease and heart attack, heart failure, stroke, kidney failure, and eye damage. How can you prevent high blood pressure? · Stay at a healthy weight. · Try to limit how much sodium you eat to less than 2,300 milligrams (mg) a day. If you limit your sodium to 1,500 mg a day, you can lower your blood pressure even more. ? Buy foods that are labeled \"unsalted,\" \"sodium-free,\" or \"low-sodium. \" Foods labeled \"reduced-sodium\" and \"light sodium\" may still have too much sodium. ? Flavor your food with garlic, lemon juice, onion, vinegar, herbs, and spices instead of salt. Do not use soy sauce, steak sauce, onion salt, garlic salt, mustard, or ketchup on your food. ? Use less salt (or none) when recipes call for it. You can often use half the salt a recipe calls for without losing flavor. · Be physically active. Get at least 30 minutes of exercise on most days of the week. Walking is a good choice. You also may want to do other activities, such as running, swimming, cycling, or playing tennis or team sports. · Limit alcohol to 2 drinks a day for men and 1 drink a day for women. · Eat plenty of fruits, vegetables, and low-fat dairy products. Eat less saturated and total fats. How is high blood pressure treated? · Your doctor will suggest making lifestyle changes to help your heart. For example, your doctor may ask you to eat healthy foods, quit smoking, lose extra weight, and be more active. · If lifestyle changes don't help enough, your doctor may recommend that you take medicine. · When blood pressure is very high, medicines are needed to lower it. Follow-up care is a key part of your treatment and safety. Be sure to make and go to all appointments, and call your doctor if you are having problems. It's also a good idea to know your test results and keep a list of the medicines you take. Where can you learn more? Go to [email or web address] org and sign in to your [employer] account.  Enter P501 in the Springr box to

## 2020-08-18 NOTE — PROGRESS NOTES
Chronic Disease Visit Information    BP Readings from Last 3 Encounters:   08/18/20 130/74   07/14/20 126/63   11/04/19 116/78          Hemoglobin A1C (%)   Date Value   09/30/2013 5.7     LDL Calculated (mg/dL)   Date Value   09/07/2019 143     HDL (mg/dL)   Date Value   09/07/2019 39     BUN (mg/dL)   Date Value   09/20/2017 15     CREATININE (mg/dL)   Date Value   09/20/2017 0.8     Glucose (mg/dL)   Date Value   09/20/2017 107            Have you changed or started any medications since your last visit including any over-the-counter medicines, vitamins, or herbal medicines? no   Are you having any side effects from any of your medications? -  no  Have you stopped taking any of your medications? Is so, why? -  no    Have you seen any other physician or provider since your last visit? No  Have you had any other diagnostic tests since your last visit? Yes - Records Requested--in progress  Have you been seen in the emergency room and/or had an admission to a hospital since we last saw you? No  Have you had your annual diabetic retinal (eye) exam? No  Have you had your routine dental cleaning in the past 6 months? no    Have you activated your [employer] account? If not, what are your barriers?  No: declines     Patient Care Team:  Ladi Grier MD as PCP - General (Family Medicine)  Ladi Grier MD as PCP - [facility] Provider         Medical History Review  Past Medical, Family, and Social History reviewed and does contribute to the patient presenting condition    Health Maintenance   Topic Date Due    Varicella vaccine (1 of 2 - 2-dose childhood series) 12/03/1981    HIV screen  12/03/1995    A1C test (Diabetic or Prediabetic)  09/30/2014    Potassium monitoring  09/20/2018    Creatinine monitoring  09/20/2018    Flu vaccine (1) 09/01/2020    DTaP/Tdap/Td vaccine (2 - Td) 05/06/2028    Hepatitis A vaccine  Aged Out    Hepatitis B vaccine  Aged Out    Hib vaccine  Aged Out    Meningococcal (ACWY) vaccine  Aged Out    Pneumococcal 0-64 years Vaccine  Aged Out

## 2020-09-15 ENCOUNTER — OFFICE VISIT (OUTPATIENT)
Dept: FAMILY MEDICINE CLINIC | Age: 40
End: 2020-09-15
Payer: COMMERCIAL

## 2020-09-15 VITALS
HEART RATE: 64 BPM | TEMPERATURE: 97 F | WEIGHT: 245.8 LBS | DIASTOLIC BLOOD PRESSURE: 76 MMHG | BODY MASS INDEX: 30.72 KG/M2 | SYSTOLIC BLOOD PRESSURE: 128 MMHG | RESPIRATION RATE: 16 BRPM

## 2020-09-15 PROCEDURE — 99213 OFFICE O/P EST LOW 20 MIN: CPT | Performed by: FAMILY MEDICINE

## 2020-09-15 NOTE — PROGRESS NOTES
Subjective:      Patient ID: Estrella Cavanaugh is a 44 y.o. male. HPI  Encounter Diagnosis   Name Primary?  Epididymitis, right Yes     Patient is here for follow-up of his epididymitis of his right testicle. The discomfort has decreased some but has not gone yet. He completed the course of doxycycline but had difficulty with some photosensitivity reaction especially on his face and lips. He did complete the course of antibiotics though. Based on some reading he has been doing, he decided also to alter his diet and lose weight as factors that are helpful in decreasing scrotal or epididymis pain. As a consequence, he is starting to see some weight loss and feels that he is able to walk better without discomfort. Wt Readings from Last 3 Encounters:   09/15/20 245 lb 12.8 oz (111.5 kg)   08/18/20 243 lb 11.2 oz (110.5 kg)   07/14/20 247 lb 12.8 oz (112.4 kg)   Body mass index is 30.72 kg/m². He is also interested in using some natural products as anti-inflammatory agents rather than relying on NSAIDs. He will look into using Tumeric in combination with Boswellia as natural agents. The need to use them at least a 2-month period of time before deciding whether they are effective or not was discussed also. If he gets to the point where the discomfort does not subside completely, referral to urology will be instituted. Review of Systems   Constitutional: Negative for fever. Genitourinary: Negative for difficulty urinating, discharge, dysuria, penile pain, penile swelling, scrotal swelling, testicular pain and urgency. See HPI. All other systems reviewed and are negative. Objective:   Physical Exam  Vitals signs and nursing note reviewed. Genitourinary:     Pubic Area: No rash. Penis: Normal and circumcised. Scrotum/Testes:         Right: Mass, tenderness, testicular hydrocele or varicocele not present. Epididymis:      Right: Not inflamed or enlarged. Tenderness present. No mass. Comments: Slight discomfort on direct palpation of the right epididymis yet. Assessment:       Diagnosis Orders   1. Epididymitis, right             Plan:      No orders of the defined types were placed in this encounter. Medications Discontinued During This Encounter   Medication Reason    omeprazole (PRILOSEC) 20 MG delayed release capsule Therapy completed     Current Outpatient Medications   Medication Sig Dispense Refill    escitalopram (LEXAPRO) 20 MG tablet Take 1 tablet by mouth daily 90 tablet 3    ramipril (ALTACE) 5 MG capsule Take 1 capsule by mouth daily 90 capsule 3    naproxen (NAPROSYN) 500 MG tablet Take 1 tablet by mouth 2 times daily (with meals) 30 tablet 0    Montelukast Sodium (SINGULAIR PO) Take by mouth As needed      loratadine (CLARITIN) 10 MG tablet Take 10 mg by mouth daily As needed       No current facility-administered medications for this visit. Work on losing weight. Use Tumeric combined with Boswellia twice daily for inflammation. Recheck as needed.           Petra Narayanan MD

## 2020-09-15 NOTE — PATIENT INSTRUCTIONS
You may receive a survey regarding the care you received during your visit. Your input is valuable to us. We encourage you to complete and return your survey. We hope you will choose us in the future for your healthcare needs. Work on losing weight. Use Tumeric combined with Boswellia twice daily for inflammation. Recheck as needed.

## 2020-09-15 NOTE — PROGRESS NOTES
Visit Information    Have you changed or started any medications since your last visit including any over-the-counter medicines, vitamins, or herbal medicines? no   Are you having any side effects from any of your medications? -  no  Have you stopped taking any of your medications? Is so, why? -  yes - tx complete and pt choice    Have you seen any other physician or provider since your last visit? No  Have you had any other diagnostic tests since your last visit? No  Have you been seen in the emergency room and/or had an admission to a hospital since we last saw you? No  Have you had your routine dental cleaning in the past 6 months? no    Have you activated your [employer] account? If not, what are your barriers?  No: pt choice     Patient Care Team:  Aec Sebastian MD as PCP - General (Family Medicine)  Ace Sebastian MD as PCP - [facility]    Medical History Review  Past Medical, Family, and Social History reviewed and does contribute to the patient presenting condition    Health Maintenance   Topic Date Due    Varicella vaccine (1 of 2 - 2-dose childhood series) 12/03/1981    HIV screen  12/03/1995    A1C test (Diabetic or Prediabetic)  09/30/2014    Flu vaccine (1) 09/01/2020    Potassium monitoring  08/18/2021    Creatinine monitoring  08/18/2021    DTaP/Tdap/Td vaccine (2 - Td) 05/06/2028    Hepatitis A vaccine  Aged Out    Hepatitis B vaccine  Aged Out    Hib vaccine  Aged Out    Meningococcal (ACWY) vaccine  Aged Out    Pneumococcal 0-64 years Vaccine  Aged Out

## 2020-10-05 RX ORDER — ESCITALOPRAM OXALATE 20 MG/1
TABLET ORAL
Qty: 90 TABLET | Refills: 3 | Status: SHIPPED | OUTPATIENT
Start: 2020-10-05 | End: 2021-10-07 | Stop reason: SDUPTHER

## 2021-02-03 ENCOUNTER — NURSE ONLY (OUTPATIENT)
Dept: LAB | Age: 41
End: 2021-02-03

## 2021-02-03 ENCOUNTER — OFFICE VISIT (OUTPATIENT)
Dept: FAMILY MEDICINE CLINIC | Age: 41
End: 2021-02-03
Payer: COMMERCIAL

## 2021-02-03 VITALS
SYSTOLIC BLOOD PRESSURE: 118 MMHG | DIASTOLIC BLOOD PRESSURE: 60 MMHG | HEART RATE: 64 BPM | RESPIRATION RATE: 12 BRPM | BODY MASS INDEX: 28.96 KG/M2 | TEMPERATURE: 96.6 F | WEIGHT: 231.7 LBS

## 2021-02-03 DIAGNOSIS — R68.82 DECREASED LIBIDO: ICD-10-CM

## 2021-02-03 DIAGNOSIS — R53.83 FATIGUE, UNSPECIFIED TYPE: ICD-10-CM

## 2021-02-03 DIAGNOSIS — R45.86 MOOD SWINGS: ICD-10-CM

## 2021-02-03 DIAGNOSIS — N50.811 PAIN IN RIGHT TESTICLE: Primary | ICD-10-CM

## 2021-02-03 LAB — TSH SERPL DL<=0.05 MIU/L-ACNC: 1.89 UIU/ML (ref 0.4–4.2)

## 2021-02-03 PROCEDURE — 99214 OFFICE O/P EST MOD 30 MIN: CPT | Performed by: FAMILY MEDICINE

## 2021-02-03 ASSESSMENT — ENCOUNTER SYMPTOMS
RESPIRATORY NEGATIVE: 1
GASTROINTESTINAL NEGATIVE: 1

## 2021-02-03 NOTE — PROGRESS NOTES
Visit Information    Have you changed or started any medications since your last visit including any over-the-counter medicines, vitamins, or herbal medicines? no   Are you having any side effects from any of your medications? -  no  Have you stopped taking any of your medications? Is so, why? -  no    Have you seen any other physician or provider since your last visit? No  Have you had any other diagnostic tests since your last visit? No  Have you been seen in the emergency room and/or had an admission to a hospital since we last saw you? No  Have you had your routine dental cleaning in the past 6 months? no    Have you activated your [employer] account? If not, what are your barriers?  No: declines     Patient Care Team:  Torito Nash DO as PCP - General (Family Medicine)  Torito Nash DO as PCP - [facility] Provider    Medical History Review  Past Medical, Family, and Social History reviewed and does contribute to the patient presenting condition    Health Maintenance   Topic Date Due    Hepatitis C screen  1980    Varicella vaccine (1 of 2 - 2-dose childhood series) 12/03/1981    HIV screen  12/03/1995    A1C test (Diabetic or Prediabetic)  09/30/2014    Flu vaccine (1) 09/01/2020    Potassium monitoring  08/18/2021    Creatinine monitoring  08/18/2021    Lipid screen  08/18/2025    DTaP/Tdap/Td vaccine (2 - Td) 05/06/2028    Hepatitis A vaccine  Aged Out    Hepatitis B vaccine  Aged Out    Hib vaccine  Aged Out    Meningococcal (ACWY) vaccine  Aged Out    Pneumococcal 0-64 years Vaccine  Aged Out       Patient scheduled at [facility] for ultrasound scrotum and testicles on 2/9/2021 at 8:30am.  Patient to arrive at 8am.  Instructions printed, given to and reviewed with patient

## 2021-02-03 NOTE — PROGRESS NOTES
Loida Holcomb (:  1980) is a 36 y.o. male,Established patient, here for evaluation of the following chief complaint(s): Other (epididymitis--right, re-occuring x 1 week)        SUBJECTIVE/OBJECTIVE:  HPI:    Chief Complaint   Patient presents with    Other     epididymitis--right, re-occuring x 1 week     Pt here for ongoing right testicle pain that has been going on for months. He is using tumeric and boswellia with some relief. Has failed naproxen and doxycycline. No US done. He is taking Mobic now prn. He is urinating fine. Pt also c/o feeling tired and has noticed more irritable mood. Hx of SAD, on Lexapro. Would like testosterone checked, has noticed decreased libido also. Patient Active Problem List   Diagnosis    HTN (hypertension)    GERD (gastroesophageal reflux disease)    Allergic rhinitis    Acne    Depression, stable    Examination, physical, employee    Medication monitoring encounter    Anxiety and depression    Hyperlipidemia with target LDL less than 130    Seasonal affective disorder (HCC)    Plantar fasciitis of left foot    Acute medial meniscal injury of right knee    Locking of right knee, x 2 weeks. No past surgical history on file. Review of Systems   Constitutional: Positive for fatigue. HENT: Negative. Respiratory: Negative. Cardiovascular: Negative. Gastrointestinal: Negative. Genitourinary: Positive for testicular pain. Negative for discharge, dysuria, flank pain, frequency, hematuria and penile pain. Decreased libido     Musculoskeletal: Negative. Psychiatric/Behavioral: Positive for agitation. All other systems reviewed and are negative. Physical Exam  Vitals signs and nursing note reviewed. Constitutional:       General: He is not in acute distress. Appearance: Normal appearance. He is well-developed. HENT:      Head: Normocephalic and atraumatic.

## 2021-02-04 LAB — TESTOSTERONE FREE: NORMAL

## 2021-02-09 ENCOUNTER — HOSPITAL ENCOUNTER (OUTPATIENT)
Dept: ULTRASOUND IMAGING | Age: 41
Discharge: HOME | End: 2021-02-09
Payer: COMMERCIAL

## 2021-02-09 DIAGNOSIS — N50.811 PAIN IN RIGHT TESTICLE: ICD-10-CM

## 2021-02-09 PROCEDURE — 76870 US EXAM SCROTUM: CPT

## 2021-02-10 ENCOUNTER — VIRTUAL VISIT (OUTPATIENT)
Dept: FAMILY MEDICINE CLINIC | Age: 41
End: 2021-02-10
Payer: COMMERCIAL

## 2021-02-10 DIAGNOSIS — N50.811 PAIN IN RIGHT TESTICLE: Primary | ICD-10-CM

## 2021-02-10 DIAGNOSIS — N43.3 HYDROCELE IN ADULT: ICD-10-CM

## 2021-02-10 DIAGNOSIS — I86.1 BILATERAL VARICOCELES: ICD-10-CM

## 2021-02-10 PROCEDURE — 99213 OFFICE O/P EST LOW 20 MIN: CPT | Performed by: FAMILY MEDICINE

## 2021-02-10 ASSESSMENT — ENCOUNTER SYMPTOMS
RESPIRATORY NEGATIVE: 1
GASTROINTESTINAL NEGATIVE: 1

## 2021-02-10 NOTE — PROGRESS NOTES
Moris Neves (:  1980) is a 36 y.o. male,Established patient, here for evaluation of the following chief complaint(s): Abnormal Test Results      SUBJECTIVE/OBJECTIVE:  HPI:    Chief Complaint   Patient presents with    Abnormal Test Results     Pt presents today to discuss US results. Impression   There are small bilateral hydroceles and tiny bilateral varicoceles. Study otherwise unremarkable. Still with pain. Affecting ADLs. Would like to see Urology. Patient Active Problem List   Diagnosis    HTN (hypertension)    GERD (gastroesophageal reflux disease)    Allergic rhinitis    Acne    Depression, stable    Examination, physical, employee    Medication monitoring encounter    Anxiety and depression    Hyperlipidemia with target LDL less than 130    Seasonal affective disorder (HCC)    Plantar fasciitis of left foot    Acute medial meniscal injury of right knee    Locking of right knee, x 2 weeks. No past surgical history on file. Review of Systems   Constitutional: Negative. HENT: Negative. Respiratory: Negative. Cardiovascular: Negative. Gastrointestinal: Negative. Genitourinary: Positive for testicular pain. Musculoskeletal: Negative. All other systems reviewed and are negative. No flowsheet data found. Physical Exam  Constitutional:       General: He is not in acute distress. Appearance: Normal appearance. He is well-developed. He is not ill-appearing. HENT:      Head: Normocephalic and atraumatic. Right Ear: External ear normal.      Left Ear: External ear normal.   Eyes:      Conjunctiva/sclera: Conjunctivae normal.   Pulmonary:      Effort: Pulmonary effort is normal. No respiratory distress. Skin:     Findings: No rash (on exposed surfaces). Neurological:      Mental Status: He is alert and oriented to person, place, and time.    Psychiatric:         Mood and Affect: Mood normal. Behavior: Behavior normal.         Thought Content: Thought content normal.         Judgment: Judgment normal.       ASSESSMENT/PLAN:  1. Pain in right testicle  -     Winnie Turcios MD, Urology, [facility]  2. Hydrocele in adult  -     Josh Pierson MD, Urology, [facility]  3. Bilateral varicoceles  -     Winnie Turcios MD, Urology, [facility]      Return if symptoms worsen or fail to improve. Yessica Ulloa is a 36 y.o. male being evaluated by a Virtual Visit (video visit) encounter to address concerns as mentioned above. A caregiver was present when appropriate. Due to this being a TeleHealth encounter (During [facility]- public health emergency), evaluation of the following organ systems was limited: Vitals/Constitutional/EENT/Resp/CV/GI//MS/Neuro/Skin/Heme-Lymph-Imm. Pursuant to the emergency declaration under the [address] [place] [address] authority and the [employer] Act, this Virtual Visit was conducted with patient's (and/or legal guardian's) consent, to reduce the patient's risk of exposure to COVID-19 and provide necessary medical care. The patient (and/or legal guardian) has also been advised to contact this office for worsening conditions or problems, and seek emergency medical treatment and/or call 911 if deemed necessary. Patient identification was verified at the start of the visit: Yes    Services were provided through a video synchronous discussion virtually to substitute for in-person clinic visit. Patient was located at home and provider was located in office or at home. An electronic signature was used to authenticate this note.     --Yesenia Ip, DO

## 2021-03-09 ENCOUNTER — OFFICE VISIT (OUTPATIENT)
Dept: UROLOGY | Age: 41
End: 2021-03-09
Payer: COMMERCIAL

## 2021-03-09 VITALS — HEIGHT: 75 IN | TEMPERATURE: 97.1 F | WEIGHT: 227.1 LBS | BODY MASS INDEX: 28.24 KG/M2

## 2021-03-09 DIAGNOSIS — R39.12 WEAK URINARY STREAM: ICD-10-CM

## 2021-03-09 DIAGNOSIS — N50.819 PAIN IN TESTICLE, UNSPECIFIED LATERALITY: Primary | ICD-10-CM

## 2021-03-09 LAB
BILIRUBIN, POC: NORMAL
BLOOD URINE, POC: NORMAL
CLARITY, POC: CLEAR
COLOR, POC: YELLOW
GLUCOSE URINE, POC: NORMAL
KETONES, POC: NORMAL
LEUKOCYTE EST, POC: NORMAL
NITRITE, POC: NORMAL
PH, POC: 6.5
PROTEIN, POC: NORMAL
SPECIFIC GRAVITY, POC: 1.02
UROBILINOGEN, POC: 0.2

## 2021-03-09 PROCEDURE — 99204 OFFICE O/P NEW MOD 45 MIN: CPT | Performed by: UROLOGY

## 2021-03-09 PROCEDURE — 81003 URINALYSIS AUTO W/O SCOPE: CPT | Performed by: UROLOGY

## 2021-03-09 RX ORDER — CIPROFLOXACIN 500 MG/1
500 TABLET, FILM COATED ORAL 2 TIMES DAILY
Qty: 28 TABLET | Refills: 0 | Status: SHIPPED | OUTPATIENT
Start: 2021-03-09 | End: 2021-03-23

## 2021-03-09 RX ORDER — TAMSULOSIN HYDROCHLORIDE 0.4 MG/1
0.4 CAPSULE ORAL DAILY
Qty: 90 CAPSULE | Refills: 3 | Status: SHIPPED | OUTPATIENT
Start: 2021-03-09 | End: 2021-11-16 | Stop reason: SDUPTHER

## 2021-03-09 NOTE — PROGRESS NOTES
ONI Thakur MD        [address] [facility] [address]  Dept: [phone]  Dept Fax: [phone]: [address] [address] Urology Office Note -     Patient:  Siobhan Leblanc  YOB: 1980  Date: 3/13/2021    The patient is a 36 y.o. male who presents today for evaluation of the following problems:   Chief Complaint   Patient presents with    Advice Only     New Patient, testicular pain, referred by Dr Hillary Granados    referred/consultation requested by Villa Eller DO.    HISTORY OF PRESENT ILLNESS:       Right testicular pain. Intermittent. Activity worsens the pain. Once pain flares up it lingers. He has tried Doxy in the past with minimal improvement. Pain since June of 2020  Denies vasectomy, cystoscopy, infection, or any urinary issues. Examination: small epididymal cyst on the right side, bilateral grade 1 varicocele, tenderness of right epididymis   Denies erectile dysfunction   Scrotal ultrasound results reviewed. He was put on meloxicam.     PSA 0.51 in August 2020  Father has prostate issues  Family hx of prostate cancer- uncle      Weak stream  Discussed prescribing flomax.        Requested/reviewed records from Villa Eller DO office and/or outside [de-identified]    (Patient's old records have been requested, reviewed and pertinent findings summarized in today's note.)    Procedures Today: N/A      Last several PSA's:  Lab Results   Component Value Date    PSA 0.51 08/18/2020       Last total testosterone:  No results found for: TESTOSTERONE    Urinalysis today:  Results for POC orders placed in visit on 03/09/21   POCT Urinalysis No Micro (Auto)   Result Value Ref Range    Color, UA yellow     Clarity, UA clear     Glucose, UA POC neg     Bilirubin, UA neg     Ketones, UA neg     Spec Grav, UA 1.025     Blood, UA POC neg     pH, UA 6.5     Protein, UA POC neg Urobilinogen, UA 0.2     Leukocytes, UA neg     Nitrite, UA neg        Last BUN and creatinine:  Lab Results   Component Value Date    BUN 23 (H) 08/18/2020     Lab Results   Component Value Date    CREATININE 0.8 08/18/2020         Imaging Reviewed during this Office Visit:   Tk Rainey MD independently reviewed the images and verified the radiology reports from:    Us Scrotum And Testicles    Result Date: 2/9/2021  PROCEDURE: US SCROTUM AND TESTICLES CLINICAL INFORMATION: Pain in right testicle COMPARISON: No prior study. TECHNIQUE: Multiple grayscale and color flow sonographic images of both testicles and other scrotal contents were obtained. Spectral Doppler waveforms were also generated. FINDINGS: Right Testicle- 4.9 x 3.2 x 2.4 cm Right Epididymis- 1.1 x 1 x 1.2 cm Left Testicle - 4.9 x 3 x 2.4 cm Left Epididymis - 1.2 x 1.2 x 1 cm Testicles: Both testicles are normal in size, contour, and echogenicity. Normal vascular color flow demonstrated to both testicles. Spectral Doppler waveforms of both testicles are normal. Epididymi:  Both epididymal heads are unremarkable. Hydrocele: Small bilateral hydroceles. Varicocele: Tiny bilateral varicoceles. There are small bilateral hydroceles and tiny bilateral varicoceles. Study otherwise unremarkable. **This report has been created using voice recognition software. It may contain minor errors which are inherent in voice recognition technology. ** Final report electronically signed by Dr. Aundrea Del Toro on 2/9/2021 2:39 PM      PAST MEDICAL, FAMILY AND SOCIAL HISTORY:  History reviewed. No pertinent past medical history. History reviewed. No pertinent surgical history.   Family History   Problem Relation Age of Onset    Cancer Mother     Heart Disease Father     Heart Disease Maternal Grandmother      Outpatient Medications Marked as Taking for the 3/9/21 encounter (Office Visit) with Moris Martinez MD   Medication Sig Dispense Refill    ciprofloxacin (CIPRO) 500 MG tablet Take 1 tablet by mouth 2 times daily for 14 days 28 tablet 0    tamsulosin (FLOMAX) 0.4 MG capsule Take 1 capsule by mouth daily 90 capsule 3    escitalopram (LEXAPRO) 20 MG tablet TAKE 1 TABLET BY MOUTH EVERY DAY 90 tablet 3    ramipril (ALTACE) 5 MG capsule Take 1 capsule by mouth daily 90 capsule 3       Patient has no known allergies. Social History     Tobacco Use   Smoking Status Former Smoker    Packs/day: 0.20    Years: 10.00    Pack years: 2.00    Types: Cigars    Quit date: 8/1/2017    Years since quitting: 3.6   Smokeless Tobacco Never Used      (If patient a smoker, smoking cessation counseling offered)   Social History     Substance and Sexual Activity   Alcohol Use Yes    Alcohol/week: 0.0 standard drinks       REVIEW OF SYSTEMS:  Constitutional: negative  Eyes: negative  Respiratory: negative  Cardiovascular: negative  Gastrointestinal: negative  Genitourinary: see HPI  Musculoskeletal: negative  Skin: negative   Neurological: negative  Hematological/Lymphatic: negative  Psychological: negative        Physical Exam:    This a 36 y.o. male  Vitals:    03/09/21 1435   Temp: 97.1 °F (36.2 °C)     Body mass index is 28.39 kg/m². Constitutional: Patient in no acute distress; Neuro: alert and oriented to person place and time. Psych: Mood and affect normal.  Skin: warm, dry  Lungs: Respiratory effort normal, Symmetric chest rise  Cardiovascular:  Normal peripheral pulses; Regular rate, radial pulses palpable  Abdomen: Soft, non-tender, non-distended with no CVA, flank pain, hepatosplenomegaly or hernia. Kidneys normal.  Bladder non-tender and not distended. Lymphatics: no palpable lymphadenopathy  Minimal/no edema in bilateral lower extremities        Assessment and Plan        1. Pain in testicle, unspecified laterality    2.  Weak urinary stream               Plan:      cipro 500mg for two weeks for presumptive epididymitis   will start Flomax for weak stream  May consider CT scan in pain persists.      Return in two weeks to reassess symptoms        Prescriptions Ordered:  Orders Placed This Encounter   Medications    ciprofloxacin (CIPRO) 500 MG tablet     Sig: Take 1 tablet by mouth 2 times daily for 14 days     Dispense:  28 tablet     Refill:  0    tamsulosin (FLOMAX) 0.4 MG capsule     Sig: Take 1 capsule by mouth daily     Dispense:  90 capsule     Refill:  3      Orders Placed:  Orders Placed This Encounter   Procedures    POCT Urinalysis No Micro (Auto)            ONI Miller MD

## 2021-04-13 ENCOUNTER — OFFICE VISIT (OUTPATIENT)
Dept: UROLOGY | Age: 41
End: 2021-04-13
Payer: COMMERCIAL

## 2021-04-13 ENCOUNTER — TELEPHONE (OUTPATIENT)
Dept: UROLOGY | Age: 41
End: 2021-04-13

## 2021-04-13 VITALS
BODY MASS INDEX: 28.47 KG/M2 | SYSTOLIC BLOOD PRESSURE: 112 MMHG | DIASTOLIC BLOOD PRESSURE: 62 MMHG | HEIGHT: 75 IN | WEIGHT: 229 LBS

## 2021-04-13 DIAGNOSIS — R39.12 WEAK URINARY STREAM: ICD-10-CM

## 2021-04-13 DIAGNOSIS — R10.31 RIGHT GROIN PAIN: ICD-10-CM

## 2021-04-13 DIAGNOSIS — N50.819 PAIN IN TESTICLE, UNSPECIFIED LATERALITY: Primary | ICD-10-CM

## 2021-04-13 DIAGNOSIS — R10.31 RIGHT LOWER QUADRANT PAIN: ICD-10-CM

## 2021-04-13 PROCEDURE — 99214 OFFICE O/P EST MOD 30 MIN: CPT | Performed by: UROLOGY

## 2021-04-13 NOTE — PROGRESS NOTES
ONI Carr MD        [facility] [address] [facility] [address]  Dept: [phone]  Dept Fax: [phone]: [address] [address] Urology Office Note -     Patient:  Héctor Palafox  YOB: 1980    The patient is a 36 y.o. male who presents today for evaluation of the following problems:   No chief complaint on file. referred/consultation requested by Adam Calvo DO.    HISTORY OF PRESENT ILLNESS:       Right testicular pain. Not improved. Still painful. Intermittent. Activity worsens the pain. Once pain flares up it lingers. He has tried Doxy and cipro in the past with minimal improvement. Pain since June of 2020  Denies vasectomy, cystoscopy, infection, or any urinary issues. Examination: small epididymal cyst on the right side, bilateral grade 1 varicocele, tenderness of right epididymis   Denies erectile dysfunction       PSA 0.51 in August 2020  Father has prostate issues  Family hx of prostate cancer- uncle      Weak stream  Improved with flomax      Requested/reviewed records from Adam Calvo DO office and/or outside [de-identified]    (Patient's old records have been requested, reviewed and pertinent findings summarized in today's note.)    Procedures Today: N/A      Last several PSA's:  Lab Results   Component Value Date    PSA 0.51 08/18/2020       Last total testosterone:  No results found for: TESTOSTERONE    Urinalysis today:  No results found for this visit on 04/13/21.     Last BUN and creatinine:  Lab Results   Component Value Date    BUN 23 (H) 08/18/2020     Lab Results   Component Value Date    CREATININE 0.8 08/18/2020         Imaging Reviewed during this Office Visit:   Nicolle Vu MD independently reviewed the images and verified the radiology reports from:    Us Scrotum And Testicles    Result Date: 2/9/2021  PROCEDURE: René Ivan Dalton 23 INFORMATION: Pain in right testicle COMPARISON: No prior study. TECHNIQUE: Multiple grayscale and color flow sonographic images of both testicles and other scrotal contents were obtained. Spectral Doppler waveforms were also generated. FINDINGS: Right Testicle- 4.9 x 3.2 x 2.4 cm Right Epididymis- 1.1 x 1 x 1.2 cm Left Testicle - 4.9 x 3 x 2.4 cm Left Epididymis - 1.2 x 1.2 x 1 cm Testicles: Both testicles are normal in size, contour, and echogenicity. Normal vascular color flow demonstrated to both testicles. Spectral Doppler waveforms of both testicles are normal. Epididymi:  Both epididymal heads are unremarkable. Hydrocele: Small bilateral hydroceles. Varicocele: Tiny bilateral varicoceles. There are small bilateral hydroceles and tiny bilateral varicoceles. Study otherwise unremarkable. **This report has been created using voice recognition software. It may contain minor errors which are inherent in voice recognition technology. ** Final report electronically signed by Dr. Bibiana Lau on 2/9/2021 2:39 PM      PAST MEDICAL, FAMILY AND SOCIAL HISTORY:  History reviewed. No pertinent past medical history. History reviewed. No pertinent surgical history. Family History   Problem Relation Age of Onset    Cancer Mother     Heart Disease Father     Heart Disease Maternal Grandmother      Outpatient Medications Marked as Taking for the 4/13/21 encounter (Office Visit) with Junior Alia MD   Medication Sig Dispense Refill    tamsulosin (FLOMAX) 0.4 MG capsule Take 1 capsule by mouth daily 90 capsule 3    escitalopram (LEXAPRO) 20 MG tablet TAKE 1 TABLET BY MOUTH EVERY DAY 90 tablet 3    ramipril (ALTACE) 5 MG capsule Take 1 capsule by mouth daily 90 capsule 3       Patient has no known allergies.   Social History     Tobacco Use   Smoking Status Former Smoker    Packs/day: 0.20    Years: 10.00    Pack years: 2.00    Types: Cigars    Quit date: 8/1/2017    Years since quitting: 3.7   Smokeless Tobacco Never Used      (If patient a smoker, smoking cessation counseling offered)   Social History     Substance and Sexual Activity   Alcohol Use Yes    Alcohol/week: 0.0 standard drinks       REVIEW OF SYSTEMS:  Constitutional: negative  Eyes: negative  Respiratory: negative  Cardiovascular: negative  Gastrointestinal: negative  Genitourinary: see HPI  Musculoskeletal: negative  Skin: negative   Neurological: negative  Hematological/Lymphatic: negative  Psychological: negative        Physical Exam:    This a 36 y.o. male  Vitals:    04/13/21 1335   BP: 112/62     Body mass index is 28.62 kg/m². Constitutional: Patient in no acute distress;         Assessment and Plan        1. Pain in testicle, unspecified laterality    2. Weak urinary stream    3. Right lower quadrant pain    4. Right groin pain               Plan:        Stream improved with flomax. continue  Right sided pain has not improved. Could be stone/need abdominal imaging to rule out abdominal path. Some intermittent pain but not terribly painful on palpation  Ct abdomen pelvis    F/u with results. Prescriptions Ordered:  No orders of the defined types were placed in this encounter.      Orders Placed:  Orders Placed This Encounter   Procedures    CT ABDOMEN PELVIS WO CONTRAST Additional Contrast? None     Standing Status:   Future     Standing Expiration Date:   4/13/2022     Order Specific Question:   Additional Contrast?     Answer:   None            ONI Rodriguez MD

## 2021-04-13 NOTE — TELEPHONE ENCOUNTER
Patient scheduled for Ct Abdomen Pelvis WO at [address] [place] [address] on 4/20/21 arrival of 2:10 pm for a 2:40 pm with no prep . Patient advised of instructions.   Order mailed/given to patient

## 2021-04-27 ENCOUNTER — HOSPITAL ENCOUNTER (OUTPATIENT)
Dept: CT IMAGING | Age: 41
Discharge: HOME | End: 2021-04-27
Payer: COMMERCIAL

## 2021-04-27 DIAGNOSIS — R10.31 RIGHT LOWER QUADRANT PAIN: ICD-10-CM

## 2021-04-27 DIAGNOSIS — R10.31 RIGHT GROIN PAIN: ICD-10-CM

## 2021-04-27 PROCEDURE — 74176 CT ABD & PELVIS W/O CONTRAST: CPT

## 2021-05-11 ENCOUNTER — OFFICE VISIT (OUTPATIENT)
Dept: UROLOGY | Age: 41
End: 2021-05-11
Payer: COMMERCIAL

## 2021-05-11 VITALS
WEIGHT: 231.2 LBS | HEIGHT: 75 IN | DIASTOLIC BLOOD PRESSURE: 60 MMHG | BODY MASS INDEX: 28.75 KG/M2 | SYSTOLIC BLOOD PRESSURE: 92 MMHG

## 2021-05-11 DIAGNOSIS — R39.12 WEAK URINARY STREAM: ICD-10-CM

## 2021-05-11 DIAGNOSIS — N50.819 PAIN IN TESTICLE, UNSPECIFIED LATERALITY: Primary | ICD-10-CM

## 2021-05-11 DIAGNOSIS — R10.31 RIGHT LOWER QUADRANT PAIN: ICD-10-CM

## 2021-05-11 DIAGNOSIS — R10.31 RIGHT GROIN PAIN: ICD-10-CM

## 2021-05-11 PROCEDURE — 99214 OFFICE O/P EST MOD 30 MIN: CPT | Performed by: UROLOGY

## 2021-05-11 RX ORDER — GABAPENTIN 100 MG/1
100 CAPSULE ORAL 2 TIMES DAILY
Qty: 180 CAPSULE | Refills: 1 | Status: SHIPPED | OUTPATIENT
Start: 2021-05-11 | End: 2021-11-16

## 2021-05-11 NOTE — PROGRESS NOTES
ONI Camp MD        [address] [facility] [place] [facility] [address]  Dept: [phone]  Dept Fax: [phone]: [address] [place] [address] Urology Office Note -     Patient:  Katie Romero  YOB: 1980    The patient is a 36 y.o. male who presents today for evaluation of the following problems:   Chief Complaint   Patient presents with    Follow-up     ct scan     referred/consultation requested by Yair Mahoney DO.    HISTORY OF PRESENT ILLNESS:       Right testicular pain. Intermittent. Activity worsens the pain. Once pain flares up it lingers. He has tried Doxy and cipro in the past with minimal improvement. Pain since June of 2020  Denies vasectomy, cystoscopy, infection, or any urinary issues. Examination: small epididymal cyst on the right side, bilateral grade 1 varicocele, tenderness of right epididymis   Denies erectile dysfunction       PSA 0.51 in August 2020  Father has prostate issues  Family hx of prostate cancer- uncle      Weak stream  Improved with flomax      Requested/reviewed records from Yair Mahoney DO office and/or outside [de-identified]    (Patient's old records have been requested, reviewed and pertinent findings summarized in today's note.)    Procedures Today: N/A      Last several PSA's:  Lab Results   Component Value Date    PSA 0.51 08/18/2020       Last total testosterone:  No results found for: TESTOSTERONE    Urinalysis today:  No results found for this visit on 05/11/21.     Last BUN and creatinine:  Lab Results   Component Value Date    BUN 23 (H) 08/18/2020     Lab Results   Component Value Date    CREATININE 0.8 08/18/2020         Imaging Reviewed during this Office Visit:   Ortega Paulson MD independently reviewed the images and verified the radiology reports from:      imaging independently reviewed by Ortega Paulson MD and radiology report verified demonstrating     Ct Abdomen Pelvis Wo Contrast Additional Contrast? None    Result Date: 4/27/2021  PROCEDURE: CT ABDOMEN PELVIS WO CONTRAST CLINICAL INFORMATION: Right lower quadrant pain, Right groin pain . COMPARISON: None. TECHNIQUE: Axial 5 mm CT images were obtained through the abdomen and pelvis. No contrast was given. Coronal and sagittal reconstructions were obtained. All CT scans at this facility use dose modulation, iterative reconstruction, and/or weight-based dosing when appropriate to reduce radiation dose to as low as reasonably achievable. FINDINGS: There are areas of atelectasis or scarring in the right and left lower lobes posteriorly. The base of the heart is within appropriate limits. The liver is grossly normal. The spleen is normal. The adrenal glands and pancreas are grossly normal. The gallbladder is normal.  There is no hydronephrosis or stones of either kidney. No contour deforming renal masses are noted. There is a small hiatal hernia. There is no evidence for an appendicolith or periappendiceal abscess. No abnormalities of the small bowel loops are noted. The IVC and aorta are of normal caliber. .  There is a slightly thick-walled bladder. . There is mild enlargement of the prostate gland measuring 3.7 x 3.4 cm. There is no pelvic free fluid. The colon is grossly normal. There are small mesenteric and inguinal lymph nodes present. There is bilateral spondylolysis at L5. .      1. Areas of atelectasis or scarring in the right and left lower lobes posteriorly. 2. No evidence for an appendicolith or periappendiceal abscess. 3. Mild enlargement of the prostate gland and slightly thick-walled bladder. 4. Small mesenteric and inguinal lymph nodes. 5. Bilateral spondylolysis at L5. . 6. Small hiatal hernia. **This report has been created using voice recognition software. It may contain minor errors which are inherent in voice recognition technology. ** Final report electronically signed by  Delmi Gillis on 4/27/2021 1:33 PM      Us Scrotum And Testicles    Result Date: 2/9/2021  PROCEDURE: US SCROTUM AND TESTICLES CLINICAL INFORMATION: Pain in right testicle COMPARISON: No prior study. TECHNIQUE: Multiple grayscale and color flow sonographic images of both testicles and other scrotal contents were obtained. Spectral Doppler waveforms were also generated. FINDINGS: Right Testicle- 4.9 x 3.2 x 2.4 cm Right Epididymis- 1.1 x 1 x 1.2 cm Left Testicle - 4.9 x 3 x 2.4 cm Left Epididymis - 1.2 x 1.2 x 1 cm Testicles: Both testicles are normal in size, contour, and echogenicity. Normal vascular color flow demonstrated to both testicles. Spectral Doppler waveforms of both testicles are normal. Epididymi:  Both epididymal heads are unremarkable. Hydrocele: Small bilateral hydroceles. Varicocele: Tiny bilateral varicoceles. There are small bilateral hydroceles and tiny bilateral varicoceles. Study otherwise unremarkable. **This report has been created using voice recognition software. It may contain minor errors which are inherent in voice recognition technology. ** Final report electronically signed by Dr. Tasha Bishop on 2/9/2021 2:39 PM      PAST MEDICAL, FAMILY AND SOCIAL HISTORY:  History reviewed. No pertinent past medical history. History reviewed. No pertinent surgical history.   Family History   Problem Relation Age of Onset    Cancer Mother     Heart Disease Father     Heart Disease Maternal Grandmother      Outpatient Medications Marked as Taking for the 5/11/21 encounter (Office Visit) with Liv Little MD   Medication Sig Dispense Refill    tamsulosin (FLOMAX) 0.4 MG capsule Take 1 capsule by mouth daily 90 capsule 3    escitalopram (LEXAPRO) 20 MG tablet TAKE 1 TABLET BY MOUTH EVERY DAY 90 tablet 3    ramipril (ALTACE) 5 MG capsule Take 1 capsule by mouth daily 90 capsule 3    Montelukast Sodium (SINGULAIR PO) Take by mouth As needed      loratadine (CLARITIN) 10 MG tablet Take 10 mg by mouth daily As needed         Patient has no known allergies. Social History     Tobacco Use   Smoking Status Former Smoker    Packs/day: 0.20    Years: 10.00    Pack years: 2.00    Types: Cigars    Quit date: 8/1/2017    Years since quitting: 3.7   Smokeless Tobacco Never Used      (If patient a smoker, smoking cessation counseling offered)   Social History     Substance and Sexual Activity   Alcohol Use Yes    Alcohol/week: 0.0 standard drinks       REVIEW OF SYSTEMS:  Constitutional: negative  Eyes: negative  Respiratory: negative  Cardiovascular: negative  Gastrointestinal: negative  Genitourinary: see HPI  Musculoskeletal: negative  Skin: negative   Neurological: negative  Hematological/Lymphatic: negative  Psychological: negative        Physical Exam:    This a 36 y.o. male  Vitals:    05/11/21 1208   BP: 92/60     Body mass index is 28.9 kg/m². Constitutional: Patient in no acute distress;         Assessment and Plan        1. Pain in testicle, unspecified laterality    2. Weak urinary stream    3. Right lower quadrant pain    4. Right groin pain               Plan:        Stream improved with flomax. continue  Right sided pain has not improved. Could be stone/need abdominal imaging to rule out abdominal path. Some intermittent pain but not terribly painful on palpation  CT abdomen neg except mild BPH   Cont flomax  Start gabapentin for testicular pain/still present  Follow up in six months        Prescriptions Ordered:  No orders of the defined types were placed in this encounter. Orders Placed:  No orders of the defined types were placed in this encounter.            Tony Rollins MD

## 2021-08-24 ENCOUNTER — OFFICE VISIT (OUTPATIENT)
Dept: FAMILY MEDICINE CLINIC | Age: 41
End: 2021-08-24
Payer: COMMERCIAL

## 2021-08-24 VITALS
HEIGHT: 75 IN | WEIGHT: 234.1 LBS | RESPIRATION RATE: 13 BRPM | DIASTOLIC BLOOD PRESSURE: 62 MMHG | SYSTOLIC BLOOD PRESSURE: 120 MMHG | BODY MASS INDEX: 29.11 KG/M2 | HEART RATE: 70 BPM | OXYGEN SATURATION: 98 %

## 2021-08-24 DIAGNOSIS — I10 ESSENTIAL HYPERTENSION: ICD-10-CM

## 2021-08-24 DIAGNOSIS — R68.82 DECREASED LIBIDO: ICD-10-CM

## 2021-08-24 DIAGNOSIS — R45.86 MOOD SWINGS: ICD-10-CM

## 2021-08-24 DIAGNOSIS — R53.83 FATIGUE, UNSPECIFIED TYPE: ICD-10-CM

## 2021-08-24 DIAGNOSIS — K21.9 GASTROESOPHAGEAL REFLUX DISEASE WITHOUT ESOPHAGITIS: ICD-10-CM

## 2021-08-24 DIAGNOSIS — F41.9 ANXIETY AND DEPRESSION: ICD-10-CM

## 2021-08-24 DIAGNOSIS — Z00.00 WELL ADULT HEALTH CHECK: Primary | ICD-10-CM

## 2021-08-24 DIAGNOSIS — E78.5 HYPERLIPIDEMIA WITH TARGET LDL LESS THAN 130: ICD-10-CM

## 2021-08-24 DIAGNOSIS — F32.A ANXIETY AND DEPRESSION: ICD-10-CM

## 2021-08-24 PROCEDURE — 99396 PREV VISIT EST AGE 40-64: CPT | Performed by: FAMILY MEDICINE

## 2021-08-24 RX ORDER — RAMIPRIL 5 MG/1
5 CAPSULE ORAL DAILY
Qty: 90 CAPSULE | Refills: 3 | Status: SHIPPED | OUTPATIENT
Start: 2021-08-24 | End: 2022-08-23 | Stop reason: SDUPTHER

## 2021-08-24 SDOH — ECONOMIC STABILITY: FOOD INSECURITY: WITHIN THE PAST 12 MONTHS, THE FOOD YOU BOUGHT JUST DIDN'T LAST AND YOU DIDN'T HAVE MONEY TO GET MORE.: NEVER TRUE

## 2021-08-24 SDOH — ECONOMIC STABILITY: FOOD INSECURITY: WITHIN THE PAST 12 MONTHS, YOU WORRIED THAT YOUR FOOD WOULD RUN OUT BEFORE YOU GOT MONEY TO BUY MORE.: NEVER TRUE

## 2021-08-24 ASSESSMENT — ENCOUNTER SYMPTOMS
GASTROINTESTINAL NEGATIVE: 1
RESPIRATORY NEGATIVE: 1

## 2021-08-24 ASSESSMENT — SOCIAL DETERMINANTS OF HEALTH (SDOH): HOW HARD IS IT FOR YOU TO PAY FOR THE VERY BASICS LIKE FOOD, HOUSING, MEDICAL CARE, AND HEATING?: NOT HARD AT ALL

## 2021-08-24 NOTE — LETTER
[address] [address]  Phone: [phone]  Fax: 97 Faheemmariel Ted Dinh DO        August 24, 2021     Patient: Manny Paiz   YOB: 1980   Date of Visit: 8/24/2021       To Whom It May Concern:    Nilton Buchanan suffers from anxiety, depression, and HTN. He is under my care for his chronic conditions and is under good control with current medication. If you have any questions or concerns, please don't hesitate to call.     Sincerely,        Paul Perez DO

## 2021-08-24 NOTE — PROGRESS NOTES
2021    Scar Castaneda (:  1980) is a 36 y.o. male, here for a preventive medicine evaluation. 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 Doing well overall. BPs controlled. BP Readings from Last 3 Encounters:   21 120/62   21 92/60   21 112/62     Wt Readings from Last 3 Encounters:   21 234 lb 1.6 oz (106.2 kg)   21 231 lb 3.2 oz (104.9 kg)   21 229 lb (103.9 kg)     Mood controlled on current meds. Patient Active Problem List   Diagnosis    HTN (hypertension)    GERD (gastroesophageal reflux disease)    Allergic rhinitis    Acne    Depression, stable    Examination, physical, employee    Medication monitoring encounter    Anxiety and depression    Hyperlipidemia with target LDL less than 130    Seasonal affective disorder (HCC)    Plantar fasciitis of left foot    Acute medial meniscal injury of right knee    Locking of right knee, x 2 weeks. Review of Systems   Constitutional: Negative. HENT: Negative. Respiratory: Negative. Cardiovascular: Negative. Gastrointestinal: Negative. Musculoskeletal: Negative. All other systems reviewed and are negative. Prior to Visit Medications    Medication Sig Taking? Authorizing Provider   ramipril (ALTACE) 5 MG capsule Take 1 capsule by mouth daily Yes Lubna Mathis DO   tamsulosin (FLOMAX) 0.4 MG capsule Take 1 capsule by mouth daily Yes Zuleyma Hicks MD   escitalopram (LEXAPRO) 20 MG tablet TAKE 1 TABLET BY MOUTH EVERY DAY Yes Davy Neumann MD   gabapentin (NEURONTIN) 100 MG capsule Take 1 capsule by mouth 2 times daily for 180 days.  Intended supply: 90 days  Patient not taking: Reported on 2021  Zuleyma Hicks MD   Montelukast Sodium (SINGULAIR PO) Take by mouth As needed  Patient not taking: Reported on 2021  Historical Provider, MD   loratadine (CLARITIN) 10 MG tablet Take 10 mg by mouth daily As needed  Patient not taking: Reported on 2021  Historical Provider, MD        No Known Allergies    No past medical history on file. No past surgical history on file. Social History     Socioeconomic History    Marital status:      Spouse name: Not on file    Number of children: Not on file    Years of education: Not on file    Highest education level: Not on file   Occupational History    Not on file   Tobacco Use    Smoking status: Former Smoker     Packs/day: 0.20     Years: 10.00     Pack years: 2.00     Types: Cigars     Quit date: 2017     Years since quittin.0    Smokeless tobacco: Never Used   Vaping Use    Vaping Use: Never used   Substance and Sexual Activity    Alcohol use: Yes     Alcohol/week: 0.0 standard drinks    Drug use: No    Sexual activity: Not on file   Other Topics Concern    Not on file   Social History Narrative    Not on file     Social Determinants of Health     Financial Resource Strain: Low Risk     Difficulty of Paying Living Expenses: Not hard at all   Food Insecurity: No Food Insecurity    Worried About Running Out of Food in the Last Year: Never true    [address] in the Last Year: Never true   Transportation Needs:     Lack of Transportation (Medical):      Lack of Transportation (Non-Medical):    Physical Activity:     Days of Exercise per Week:     Minutes of Exercise per Session:    Stress:     Feeling of Stress :    Social Connections:     Frequency of Communication with Friends and Family:     Frequency of Social Gatherings with Friends and Family:     Attends Congregational Services:     Active Member of Clubs or Organizations:     Attends Club or Organization Meetings:     Marital Status:    Intimate Partner Violence:     Fear of Current or Ex-Partner:     Emotionally Abused:  Physically Abused:     Sexually Abused:         Family History   Problem Relation Age of Onset    Cancer Mother     Heart Disease Father     Heart Disease Maternal Grandmother        ADVANCE DIRECTIVE: N, <no information>    Vitals:    08/24/21 1346   BP: 120/62   Pulse: 70   Resp: 13   SpO2: 98%   Weight: 234 lb 1.6 oz (106.2 kg)   Height: 6' 3\" (1.905 m)     Estimated body mass index is 29.26 kg/m² as calculated from the following:    Height as of this encounter: 6' 3\" (1.905 m). Weight as of this encounter: 234 lb 1.6 oz (106.2 kg). Physical Exam  Vitals and nursing note reviewed. Constitutional:       General: He is not in acute distress. Appearance: Normal appearance. He is well-developed. HENT:      Head: Normocephalic and atraumatic. Right Ear: Tympanic membrane normal.      Left Ear: Tympanic membrane normal.   Eyes:      Conjunctiva/sclera: Conjunctivae normal.   Cardiovascular:      Rate and Rhythm: Normal rate and regular rhythm. Heart sounds: Normal heart sounds. No murmur heard. Pulmonary:      Effort: Pulmonary effort is normal.      Breath sounds: Normal breath sounds. No wheezing, rhonchi or rales. Abdominal:      General: There is no distension. Musculoskeletal:      Cervical back: Neck supple. Skin:     General: Skin is warm and dry. Findings: No rash (on exposed surfaces). Neurological:      General: No focal deficit present. Mental Status: He is alert. Psychiatric:         Attention and Perception: Attention normal.         Mood and Affect: Mood normal.         Speech: Speech normal.         Behavior: Behavior normal. Behavior is cooperative. Thought Content: Thought content normal.         Judgment: Judgment normal.         No flowsheet data found.     Lab Results   Component Value Date    CHOL 219 09/07/2019    CHOL 237 09/20/2017    CHOL 188 10/16/2014    CHOLFAST 216 08/18/2020    TRIG 184 09/07/2019    TRIG 329 09/20/2017    TRIG 218 10/16/2014    TRIGLYCFAST 119 08/18/2020    HDL 36 08/18/2020    HDL 39 09/07/2019    HDL 36 09/20/2017    LDLCALC 156 08/18/2020    LDLCALC 143 09/07/2019    LDLCALC 135 09/20/2017    GLUF 106 08/18/2020    GLUCOSE 107 09/20/2017    LABA1C 5.7 09/30/2013       The 10-year ASCVD risk score (Bridget Sabillon et al., 2013) is: 2.2%    Values used to calculate the score:      Age: 36 years      Sex: Male      Is Non- : No      Diabetic: No      Tobacco smoker: No      Systolic Blood Pressure: 464 mmHg      Is BP treated: Yes      HDL Cholesterol: 36 mg/dL      Total Cholesterol: 216 mg/dl    Immunization History   Administered Date(s) Administered    Influenza Virus Vaccine 10/16/2013, 10/16/2014, 11/18/2015    Tdap (Boostrix, Adacel) 05/06/2018       Health Maintenance   Topic Date Due    Hepatitis C screen  Never done    Varicella vaccine (1 of 2 - 2-dose childhood series) Never done    COVID-19 Vaccine (1) Never done    HIV screen  Never done    A1C test (Diabetic or Prediabetic)  09/30/2014    Potassium monitoring  08/18/2021    Creatinine monitoring  08/18/2021    Flu vaccine (1) 09/01/2021    Lipid screen  08/18/2025    DTaP/Tdap/Td vaccine (2 - Td or Tdap) 05/06/2028    Hepatitis A vaccine  Aged Out    Hepatitis B vaccine  Aged Out    Hib vaccine  Aged Out    Meningococcal (ACWY) vaccine  Aged Out    Pneumococcal 0-64 years Vaccine  Aged Out          ASSESSMENT/PLAN:  1. Well adult health check  -     Lipid Panel w/ Reflex Direct LDL; Future  -     Comprehensive Metabolic Panel; Future  -     Hemoglobin A1C; Future  -     TSH with Reflex; Future  -     CBC Auto Differential; Future  -     PSA screening; Future  2. Essential hypertension  -     ramipril (ALTACE) 5 MG capsule; Take 1 capsule by mouth daily, Disp-90 capsule, R-3Normal  3. Anxiety and depression  4. Gastroesophageal reflux disease without esophagitis  5. Mood swings  6. Decreased libido  7.  Hyperlipidemia with target LDL less than 130  8. Fatigue, unspecified type    -  Healthy lifestyle discussed  -  Chronic medical problems stable  -  Continue current medications  -  Follow up with specialists as scheduled  -  Check labs, will call    Return in about 1 year (around 8/24/2022) for Wellness. An electronic signature was used to authenticate this note.     --Michelle Ingram, DO on 8/24/2021 at 7:54 PM

## 2021-10-07 DIAGNOSIS — F41.9 ANXIETY AND DEPRESSION: ICD-10-CM

## 2021-10-07 DIAGNOSIS — F32.A ANXIETY AND DEPRESSION: ICD-10-CM

## 2021-10-07 RX ORDER — ESCITALOPRAM OXALATE 20 MG/1
TABLET ORAL
Qty: 90 TABLET | Refills: 3 | Status: SHIPPED | OUTPATIENT
Start: 2021-10-07 | End: 2022-07-29

## 2021-11-16 ENCOUNTER — OFFICE VISIT (OUTPATIENT)
Dept: UROLOGY | Age: 41
End: 2021-11-16
Payer: COMMERCIAL

## 2021-11-16 VITALS
WEIGHT: 240 LBS | SYSTOLIC BLOOD PRESSURE: 116 MMHG | BODY MASS INDEX: 29.84 KG/M2 | DIASTOLIC BLOOD PRESSURE: 64 MMHG | HEIGHT: 75 IN

## 2021-11-16 DIAGNOSIS — R39.12 WEAK URINARY STREAM: ICD-10-CM

## 2021-11-16 DIAGNOSIS — N50.819 PAIN IN TESTICLE, UNSPECIFIED LATERALITY: Primary | ICD-10-CM

## 2021-11-16 PROCEDURE — 99214 OFFICE O/P EST MOD 30 MIN: CPT | Performed by: UROLOGY

## 2021-11-16 RX ORDER — TAMSULOSIN HYDROCHLORIDE 0.4 MG/1
0.4 CAPSULE ORAL DAILY
Qty: 90 CAPSULE | Refills: 3 | Status: SHIPPED | OUTPATIENT
Start: 2021-11-16 | End: 2022-03-29 | Stop reason: SINTOL

## 2021-11-16 NOTE — PROGRESS NOTES
ONI Wilson MD        [place] [address]  [address] [place] [address]  Dept: [phone]  Dept Fax: 21 [phone]: [address] [address] Urology Office Note -     Patient:  Viktor Galarza  YOB: 1980    The patient is a 36 y.o. male who presents today for evaluation of the following problems:   Chief Complaint   Patient presents with    Follow-up     med check     referred/consultation requested by Heinz Denver, DO.    HISTORY OF PRESENT ILLNESS:       Right testicular pain. Intermittent. Activity worsens the pain. Has been stable  He has tried Doxy and cipro in the past with minimal improvement. Pain since June of 2020  Denies vasectomy, cystoscopy, infection, or any urinary issues. Examination: small epididymal cyst on the right side, bilateral grade 1 varicocele, tenderness of right epididymis   Denies erectile dysfunction       Weak stream  Improved with flomax  Stable. Father has prostate issues  Family hx of prostate cancer- uncle          Requested/reviewed records from Heinz Denver, DO office and/or outside physician/EMR    (Patient's old records have been requested, reviewed and pertinent findings summarized in today's note.)    Procedures Today: N/A      Last several PSA's:  Lab Results   Component Value Date    PSA 0.51 08/18/2020       Last total testosterone:  No results found for: TESTOSTERONE    Urinalysis today:  No results found for this visit on 11/16/21.     Last BUN and creatinine:  Lab Results   Component Value Date    BUN 23 (H) 08/18/2020     Lab Results   Component Value Date    CREATININE 0.8 08/18/2020         Imaging Reviewed during this Office Visit:   Yaneth Hagan MD independently reviewed the images and verified the radiology reports from:      imaging independently reviewed by Yaneth Hagan MD and radiology report verified demonstrating     Ct Abdomen Pelvis Wo Contrast Additional Contrast? None    Result Date: 4/27/2021  PROCEDURE: CT ABDOMEN PELVIS WO CONTRAST CLINICAL INFORMATION: Right lower quadrant pain, Right groin pain . COMPARISON: None. TECHNIQUE: Axial 5 mm CT images were obtained through the abdomen and pelvis. No contrast was given. Coronal and sagittal reconstructions were obtained. All CT scans at this facility use dose modulation, iterative reconstruction, and/or weight-based dosing when appropriate to reduce radiation dose to as low as reasonably achievable. FINDINGS: There are areas of atelectasis or scarring in the right and left lower lobes posteriorly. The base of the heart is within appropriate limits. The liver is grossly normal. The spleen is normal. The adrenal glands and pancreas are grossly normal. The gallbladder is normal.  There is no hydronephrosis or stones of either kidney. No contour deforming renal masses are noted. There is a small hiatal hernia. There is no evidence for an appendicolith or periappendiceal abscess. No abnormalities of the small bowel loops are noted. The IVC and aorta are of normal caliber. .  There is a slightly thick-walled bladder. . There is mild enlargement of the prostate gland measuring 3.7 x 3.4 cm. There is no pelvic free fluid. The colon is grossly normal. There are small mesenteric and inguinal lymph nodes present. There is bilateral spondylolysis at L5. .      1. Areas of atelectasis or scarring in the right and left lower lobes posteriorly. 2. No evidence for an appendicolith or periappendiceal abscess. 3. Mild enlargement of the prostate gland and slightly thick-walled bladder. 4. Small mesenteric and inguinal lymph nodes. 5. Bilateral spondylolysis at L5. . 6. Small hiatal hernia. **This report has been created using voice recognition software. It may contain minor errors which are inherent in voice recognition technology. ** Final report electronically signed by DR Daniel Bonilla on 4/27/2021 1:33 PM      Us Scrotum And Testicles    Result Date: 2/9/2021  PROCEDURE: US SCROTUM AND TESTICLES CLINICAL INFORMATION: Pain in right testicle COMPARISON: No prior study. TECHNIQUE: Multiple grayscale and color flow sonographic images of both testicles and other scrotal contents were obtained. Spectral Doppler waveforms were also generated. FINDINGS: Right Testicle- 4.9 x 3.2 x 2.4 cm Right Epididymis- 1.1 x 1 x 1.2 cm Left Testicle - 4.9 x 3 x 2.4 cm Left Epididymis - 1.2 x 1.2 x 1 cm Testicles: Both testicles are normal in size, contour, and echogenicity. Normal vascular color flow demonstrated to both testicles. Spectral Doppler waveforms of both testicles are normal. Epididymi:  Both epididymal heads are unremarkable. Hydrocele: Small bilateral hydroceles. Varicocele: Tiny bilateral varicoceles. There are small bilateral hydroceles and tiny bilateral varicoceles. Study otherwise unremarkable. **This report has been created using voice recognition software. It may contain minor errors which are inherent in voice recognition technology. ** Final report electronically signed by Dr. Sia Rose on 2/9/2021 2:39 PM      PAST MEDICAL, FAMILY AND SOCIAL HISTORY:  No past medical history on file. No past surgical history on file. Family History   Problem Relation Age of Onset    Cancer Mother     Heart Disease Father     Heart Disease Maternal Grandmother      Outpatient Medications Marked as Taking for the 11/16/21 encounter (Office Visit) with Maynor Fall MD   Medication Sig Dispense Refill    tamsulosin (FLOMAX) 0.4 MG capsule Take 1 capsule by mouth daily 90 capsule 3    escitalopram (LEXAPRO) 20 MG tablet TAKE 1 TABLET BY MOUTH EVERY DAY 90 tablet 3    ramipril (ALTACE) 5 MG capsule Take 1 capsule by mouth daily 90 capsule 3    loratadine (CLARITIN) 10 MG tablet Take 10 mg by mouth daily As needed         Patient has no known allergies.   Social History     Tobacco Use   Smoking Status Former Smoker    Packs/day: 0.20    Years: 10.00    Pack years: 2.00    Types: Cigars    Quit date: 2017    Years since quittin.2   Smokeless Tobacco Never Used      (If patient a smoker, smoking cessation counseling offered)   Social History     Substance and Sexual Activity   Alcohol Use Yes    Alcohol/week: 0.0 standard drinks       REVIEW OF SYSTEMS:  Constitutional: negative  Eyes: negative  Respiratory: negative  Cardiovascular: negative  Gastrointestinal: negative  Genitourinary: see HPI  Musculoskeletal: negative  Skin: negative   Neurological: negative  Hematological/Lymphatic: negative  Psychological: negative        Physical Exam:    This a 36 y.o. male  Vitals:    21 1255   BP: 116/64     Body mass index is 30 kg/m². Constitutional: Patient in no acute distress;         Assessment and Plan        1. Pain in testicle, unspecified laterality    2. Weak urinary stream               Plan:        Stream improved with flomax. continue  Last CT abdomen neg except mild BPH   Cont flomax for weak stream      Stop gabapentin for testicular pain-- improved overall     Follow up in one year with np        Prescriptions Ordered:  Orders Placed This Encounter   Medications    tamsulosin (FLOMAX) 0.4 MG capsule     Sig: Take 1 capsule by mouth daily     Dispense:  90 capsule     Refill:  3      Orders Placed:  No orders of the defined types were placed in this encounter.            Wendy Morfin MD

## 2021-12-06 ENCOUNTER — NURSE ONLY (OUTPATIENT)
Dept: LAB | Age: 41
End: 2021-12-06

## 2021-12-06 DIAGNOSIS — Z00.00 WELL ADULT HEALTH CHECK: ICD-10-CM

## 2021-12-06 LAB
ALBUMIN SERPL-MCNC: 5.1 G/DL (ref 3.5–5.1)
ALP BLD-CCNC: 58 U/L (ref 38–126)
ALT SERPL-CCNC: 35 U/L (ref 11–66)
ANION GAP SERPL CALCULATED.3IONS-SCNC: 12 MEQ/L (ref 8–16)
AST SERPL-CCNC: 25 U/L (ref 5–40)
AVERAGE GLUCOSE: 102 MG/DL (ref 70–126)
BASOPHILS # BLD: 0.7 %
BASOPHILS ABSOLUTE: 0 THOU/MM3 (ref 0–0.1)
BILIRUB SERPL-MCNC: 0.6 MG/DL (ref 0.3–1.2)
BUN BLDV-MCNC: 17 MG/DL (ref 7–22)
CALCIUM SERPL-MCNC: 9.8 MG/DL (ref 8.5–10.5)
CHLORIDE BLD-SCNC: 105 MEQ/L (ref 98–111)
CHOLESTEROL, TOTAL: 244 MG/DL (ref 100–199)
CO2: 24 MEQ/L (ref 23–33)
CREAT SERPL-MCNC: 0.9 MG/DL (ref 0.4–1.2)
EOSINOPHIL # BLD: 0.4 %
EOSINOPHILS ABSOLUTE: 0 THOU/MM3 (ref 0–0.4)
ERYTHROCYTE [DISTWIDTH] IN BLOOD BY AUTOMATED COUNT: 12.3 % (ref 11.5–14.5)
ERYTHROCYTE [DISTWIDTH] IN BLOOD BY AUTOMATED COUNT: 43.7 FL (ref 35–45)
GFR SERPL CREATININE-BSD FRML MDRD: > 90 ML/MIN/1.73M2
GLUCOSE BLD-MCNC: 102 MG/DL (ref 70–108)
HBA1C MFR BLD: 5.4 % (ref 4.4–6.4)
HCT VFR BLD CALC: 46.7 % (ref 42–52)
HDLC SERPL-MCNC: 41 MG/DL
HEMOGLOBIN: 15.3 GM/DL (ref 14–18)
IMMATURE GRANS (ABS): 0.01 THOU/MM3 (ref 0–0.07)
IMMATURE GRANULOCYTES: 0.2 %
LDL CHOLESTEROL CALCULATED: 186 MG/DL
LYMPHOCYTES # BLD: 22.8 %
LYMPHOCYTES ABSOLUTE: 1.3 THOU/MM3 (ref 1–4.8)
MCH RBC QN AUTO: 31.5 PG (ref 26–33)
MCHC RBC AUTO-ENTMCNC: 32.8 GM/DL (ref 32.2–35.5)
MCV RBC AUTO: 96.1 FL (ref 80–94)
MONOCYTES # BLD: 9.7 %
MONOCYTES ABSOLUTE: 0.5 THOU/MM3 (ref 0.4–1.3)
NUCLEATED RED BLOOD CELLS: 0 /100 WBC
PLATELET # BLD: 194 THOU/MM3 (ref 130–400)
PMV BLD AUTO: 10.7 FL (ref 9.4–12.4)
POTASSIUM SERPL-SCNC: 4.2 MEQ/L (ref 3.5–5.2)
PROSTATE SPECIFIC ANTIGEN: 0.53 NG/ML (ref 0–1)
RBC # BLD: 4.86 MILL/MM3 (ref 4.7–6.1)
SEG NEUTROPHILS: 66.2 %
SEGMENTED NEUTROPHILS ABSOLUTE COUNT: 3.7 THOU/MM3 (ref 1.8–7.7)
SODIUM BLD-SCNC: 141 MEQ/L (ref 135–145)
TOTAL PROTEIN: 7.3 G/DL (ref 6.1–8)
TRIGL SERPL-MCNC: 84 MG/DL (ref 0–199)
TSH SERPL DL<=0.05 MIU/L-ACNC: 1.22 UIU/ML (ref 0.4–4.2)
WBC # BLD: 5.6 THOU/MM3 (ref 4.8–10.8)

## 2022-03-01 ENCOUNTER — TELEPHONE (OUTPATIENT)
Dept: UROLOGY | Age: 42
End: 2022-03-01

## 2022-03-01 NOTE — TELEPHONE ENCOUNTER
Could be a rare, but severe side effect   I would stop Flomax and schedule follow up with Dr Amisha Phillips

## 2022-03-01 NOTE — TELEPHONE ENCOUNTER
Patient has been on the flomax approximately for 6 months. Can he take it every other day instead? He has been waking up with an erection weekly lasting for couple hours before it resolves. His BPH symptoms are good. Please advise.  Thank you

## 2022-03-29 ENCOUNTER — OFFICE VISIT (OUTPATIENT)
Dept: UROLOGY | Age: 42
End: 2022-03-29
Payer: COMMERCIAL

## 2022-03-29 VITALS
WEIGHT: 240 LBS | BODY MASS INDEX: 29.84 KG/M2 | HEIGHT: 75 IN | DIASTOLIC BLOOD PRESSURE: 70 MMHG | SYSTOLIC BLOOD PRESSURE: 124 MMHG

## 2022-03-29 DIAGNOSIS — N50.819 PAIN IN TESTICLE, UNSPECIFIED LATERALITY: ICD-10-CM

## 2022-03-29 DIAGNOSIS — R39.12 WEAK URINARY STREAM: Primary | ICD-10-CM

## 2022-03-29 PROCEDURE — 99214 OFFICE O/P EST MOD 30 MIN: CPT | Performed by: UROLOGY

## 2022-03-29 RX ORDER — TADALAFIL 5 MG/1
5 TABLET ORAL DAILY
Qty: 30 TABLET | Refills: 11 | Status: SHIPPED | OUTPATIENT
Start: 2022-03-29

## 2022-03-29 NOTE — PROGRESS NOTES
quadrant pain, Right groin pain . COMPARISON: None. TECHNIQUE: Axial 5 mm CT images were obtained through the abdomen and pelvis. No contrast was given. Coronal and sagittal reconstructions were obtained. All CT scans at this facility use dose modulation, iterative reconstruction, and/or weight-based dosing when appropriate to reduce radiation dose to as low as reasonably achievable. FINDINGS: There are areas of atelectasis or scarring in the right and left lower lobes posteriorly. The base of the heart is within appropriate limits. The liver is grossly normal. The spleen is normal. The adrenal glands and pancreas are grossly normal. The gallbladder is normal.  There is no hydronephrosis or stones of either kidney. No contour deforming renal masses are noted. There is a small hiatal hernia. There is no evidence for an appendicolith or periappendiceal abscess. No abnormalities of the small bowel loops are noted. The IVC and aorta are of normal caliber. .  There is a slightly thick-walled bladder. . There is mild enlargement of the prostate gland measuring 3.7 x 3.4 cm. There is no pelvic free fluid. The colon is grossly normal. There are small mesenteric and inguinal lymph nodes present. There is bilateral spondylolysis at L5. .      1. Areas of atelectasis or scarring in the right and left lower lobes posteriorly. 2. No evidence for an appendicolith or periappendiceal abscess. 3. Mild enlargement of the prostate gland and slightly thick-walled bladder. 4. Small mesenteric and inguinal lymph nodes. 5. Bilateral spondylolysis at L5. . 6. Small hiatal hernia. **This report has been created using voice recognition software. It may contain minor errors which are inherent in voice recognition technology. ** Final report electronically signed by DR Juan José Trevino on 4/27/2021 1:33 PM      Us Scrotum And Testicles    Result Date: 2/9/2021  PROCEDURE: US SCROTUM AND TESTICLES CLINICAL INFORMATION: Pain in right testicle COMPARISON: No prior study. TECHNIQUE: Multiple grayscale and color flow sonographic images of both testicles and other scrotal contents were obtained. Spectral Doppler waveforms were also generated. FINDINGS: Right Testicle- 4.9 x 3.2 x 2.4 cm Right Epididymis- 1.1 x 1 x 1.2 cm Left Testicle - 4.9 x 3 x 2.4 cm Left Epididymis - 1.2 x 1.2 x 1 cm Testicles: Both testicles are normal in size, contour, and echogenicity. Normal vascular color flow demonstrated to both testicles. Spectral Doppler waveforms of both testicles are normal. Epididymi:  Both epididymal heads are unremarkable. Hydrocele: Small bilateral hydroceles. Varicocele: Tiny bilateral varicoceles. There are small bilateral hydroceles and tiny bilateral varicoceles. Study otherwise unremarkable. **This report has been created using voice recognition software. It may contain minor errors which are inherent in voice recognition technology. ** Final report electronically signed by Dr. Ga Fajardo on 2/9/2021 2:39 PM      PAST MEDICAL, FAMILY AND SOCIAL HISTORY:  History reviewed. No pertinent past medical history. Past Surgical History:   Procedure Laterality Date    KNEE SURGERY Right 2015    knee arthroscopy      Family History   Problem Relation Age of Onset    Cancer Mother     Heart Disease Father     Heart Disease Maternal Grandmother      Outpatient Medications Marked as Taking for the 3/29/22 encounter (Office Visit) with Karlo Aguirre MD   Medication Sig Dispense Refill    Saw Palmetto, Serenoa repens, (SAW PALMETTO PO) Take by mouth daily      escitalopram (LEXAPRO) 20 MG tablet TAKE 1 TABLET BY MOUTH EVERY DAY 90 tablet 3    ramipril (ALTACE) 5 MG capsule Take 1 capsule by mouth daily 90 capsule 3    loratadine (CLARITIN) 10 MG tablet Take 10 mg by mouth daily As needed         Patient has no known allergies.   Social History     Tobacco Use   Smoking Status Former Smoker    Packs/day: 0.20    Years: 10.00    Pack years: 2.00    Types: Cigars    Quit date: 2017    Years since quittin.6   Smokeless Tobacco Never Used      (If patient a smoker, smoking cessation counseling offered)   Social History     Substance and Sexual Activity   Alcohol Use Yes    Alcohol/week: 0.0 standard drinks       REVIEW OF SYSTEMS:  Constitutional: negative  Eyes: negative  Respiratory: negative  Cardiovascular: negative  Gastrointestinal: negative  Genitourinary: see HPI  Musculoskeletal: negative  Skin: negative   Neurological: negative  Hematological/Lymphatic: negative  Psychological: negative        Physical Exam:    This a 39 y.o. male  Vitals:    22 1542   BP: 124/70     Body mass index is 30 kg/m². Constitutional: Patient in no acute distress;         Assessment and Plan        1. Weak urinary stream    2. Pain in testicle, unspecified laterality               Plan:        Stream improved with flomax. Pt stopped flomax as he states it has been causing prolonged erections. His stream has weakened. He wants cialis 2.5 mg daily as an alternative. Voiding is worsening. Last CT abdomen neg except mild BPH     Stop gabapentin for testicular pain-- improved overall       F/u in one year         Prescriptions Ordered:  No orders of the defined types were placed in this encounter. Orders Placed:  No orders of the defined types were placed in this encounter.            Laura Boss MD

## 2022-04-05 ENCOUNTER — OFFICE VISIT (OUTPATIENT)
Dept: FAMILY MEDICINE CLINIC | Age: 42
End: 2022-04-05
Payer: COMMERCIAL

## 2022-04-05 VITALS
WEIGHT: 245.5 LBS | SYSTOLIC BLOOD PRESSURE: 132 MMHG | HEART RATE: 76 BPM | RESPIRATION RATE: 16 BRPM | BODY MASS INDEX: 30.69 KG/M2 | DIASTOLIC BLOOD PRESSURE: 74 MMHG

## 2022-04-05 DIAGNOSIS — L29.9 ITCHING: ICD-10-CM

## 2022-04-05 DIAGNOSIS — F41.9 ANXIETY AND DEPRESSION: Primary | ICD-10-CM

## 2022-04-05 DIAGNOSIS — F32.A ANXIETY AND DEPRESSION: Primary | ICD-10-CM

## 2022-04-05 DIAGNOSIS — D22.9 NEVUS: ICD-10-CM

## 2022-04-05 PROCEDURE — 99213 OFFICE O/P EST LOW 20 MIN: CPT | Performed by: NURSE PRACTITIONER

## 2022-04-05 RX ORDER — HYDROXYZINE HYDROCHLORIDE 25 MG/1
25 TABLET, FILM COATED ORAL EVERY 8 HOURS PRN
Qty: 60 TABLET | Refills: 2 | Status: SHIPPED | OUTPATIENT
Start: 2022-04-05 | End: 2022-05-05

## 2022-04-05 ASSESSMENT — PATIENT HEALTH QUESTIONNAIRE - PHQ9
9. THOUGHTS THAT YOU WOULD BE BETTER OFF DEAD, OR OF HURTING YOURSELF: 0
SUM OF ALL RESPONSES TO PHQ QUESTIONS 1-9: 0
8. MOVING OR SPEAKING SO SLOWLY THAT OTHER PEOPLE COULD HAVE NOTICED. OR THE OPPOSITE, BEING SO FIGETY OR RESTLESS THAT YOU HAVE BEEN MOVING AROUND A LOT MORE THAN USUAL: 0
SUM OF ALL RESPONSES TO PHQ QUESTIONS 1-9: 0
4. FEELING TIRED OR HAVING LITTLE ENERGY: 0
3. TROUBLE FALLING OR STAYING ASLEEP: 0
1. LITTLE INTEREST OR PLEASURE IN DOING THINGS: 0
SUM OF ALL RESPONSES TO PHQ9 QUESTIONS 1 & 2: 0
6. FEELING BAD ABOUT YOURSELF - OR THAT YOU ARE A FAILURE OR HAVE LET YOURSELF OR YOUR FAMILY DOWN: 0
7. TROUBLE CONCENTRATING ON THINGS, SUCH AS READING THE NEWSPAPER OR WATCHING TELEVISION: 0
5. POOR APPETITE OR OVEREATING: 0
10. IF YOU CHECKED OFF ANY PROBLEMS, HOW DIFFICULT HAVE THESE PROBLEMS MADE IT FOR YOU TO DO YOUR WORK, TAKE CARE OF THINGS AT HOME, OR GET ALONG WITH OTHER PEOPLE: 0
SUM OF ALL RESPONSES TO PHQ QUESTIONS 1-9: 0
2. FEELING DOWN, DEPRESSED OR HOPELESS: 0
SUM OF ALL RESPONSES TO PHQ QUESTIONS 1-9: 0

## 2022-04-05 ASSESSMENT — ENCOUNTER SYMPTOMS: RESPIRATORY NEGATIVE: 1

## 2022-04-05 NOTE — PATIENT INSTRUCTIONS
You may receive a survey about your visit with us today. The feedback from our patients helps us identify what is working well and where the service to all patients can be enhanced. Thank you! Patient Education        Moles: Care Instructions  Overview  Moles are skin growths made up of cells that produce color (pigment). A mole can appear anywhere on the skin, alone or in groups. Most people get a few moles during their first 20 years of life. They are usually brown in color but can be blue, black, or flesh-colored. Most moles are harmless and do not causepain or other symptoms, unless you rub them or they bump against something. You usually do not need treatment for moles. But some can turn into cancer. Talk to your doctor if a mole bleeds, itches, burns, or changes size or color. Also let your doctor know if you get a new mole. Make sure to wear sunscreenand other sun protection every day to help prevent skin cancer. Follow-up care is a key part of your treatment and safety. Be sure to make and go to all appointments, and call your doctor if you are having problems. It's also a good idea to know your test results and keep alist of the medicines you take. How can you care for yourself at home?  Check all the skin on your body regularly for skin growths or other changes, such as in the color and feel of the skin. ?  front of a full-length mirror. Look carefully at the front and back of your body. Then look at your right and left sides with your arms raised. ? Bend your elbows and look carefully at your forearms, the back of your upper arms, and your palms. ? Look at your feet, the bottoms of your feet, and the spaces between your toes. ? Use a hand mirror to look at the back of your legs, the back of your neck, and your back, rear end (buttocks), and genital area. Part the hair on your head to look at your scalp.  If you see a change in a skin growth, contact your doctor. Look for:  ?  A mole that bleeds. ? A fast-growing mole. ? A scaly or crusted growth on the skin. ? A sore that will not heal.  To prevent skin cancer   Always wear sunscreen on exposed skin. Make sure to use a broad-spectrum sunscreen that has a sun protection factor (SPF) of 30 or higher. Use it every day, even when it is cloudy.  Wear a wide-brimmed hat and long sleeves and pants if you are going to be outdoors for very long.  Avoid the sun between 10 a.m. and 4 p.m., which is the peak time for the sun's ultraviolet rays.  Avoid sunburns, tanning booths, and sunlamps.  Be sure to protect children from the sun. Sunburns in childhood damage the skin and increase the risk of cancer. When should you call for help? Watch closely for changes in your health, and be sure to contact your doctor if:     A mole looks different than it did before. It may have changed in size, color, shape, or the way it looks. Where can you learn more? Go to [email or web address] and sign in to your [employer] account. Enter D506 in the PeakÂ® box to learn more about \"Moles: Care Instructions. \"     If you do not have an account, please click on the \"Sign Up Now\" link. Current as of: November 15, 2021               Content Version: 13.2  © 2006-2022 Healthwise, Incorporated. Care instructions adapted under license by [facility] ([facility]). If you have questions about a medical condition or this instruction, always ask your healthcare professional. [place] [address] any warranty or liability for your use of this information.

## 2022-04-05 NOTE — PROGRESS NOTES
Reji Abebe (1980) 39 y.o. male here for evaluation of the following chief complaint(s):      HPI:  Chief Complaint   Patient presents with    Mole     right side of head    Medication Refill       Mole on side of right side. No change. Seems to have come on last 3-4 years. No bleeding, scabbed lesion. Anxiety is doing well on Lexapro 10 mg. PRN use of Hydroxyzine. Effective for him when he uses it. Vitals:    04/05/22 1321   BP: 132/74   Pulse: 76   Resp: 16       Patient Active Problem List   Diagnosis    HTN (hypertension)    GERD (gastroesophageal reflux disease)    Allergic rhinitis    Acne    Depression, stable    Examination, physical, employee    Medication monitoring encounter    Anxiety and depression    Hyperlipidemia with target LDL less than 130    Seasonal affective disorder (HCC)    Plantar fasciitis of left foot    Acute medial meniscal injury of right knee    Locking of right knee, x 2 weeks. SUBJECTIVE/OBJECTIVE:  Review of Systems   Constitutional: Negative. Respiratory: Negative. Cardiovascular: Negative. Psychiatric/Behavioral: The patient is nervous/anxious. Physical Exam          ASSESSMENT/PLAN:   Diagnosis Orders   1. Anxiety and depression     2. Nevus     3. Itching  hydrOXYzine (ATARAX) 25 MG tablet         MDM: Reassurance on mole   Changes to be on look out for   Refill as above   RTO PRN    An electronic signature was used to authenticate this note.     --GLO Mcmahan - CNP

## 2022-07-29 DIAGNOSIS — F32.A ANXIETY AND DEPRESSION: ICD-10-CM

## 2022-07-29 DIAGNOSIS — F41.9 ANXIETY AND DEPRESSION: ICD-10-CM

## 2022-07-29 RX ORDER — ESCITALOPRAM OXALATE 20 MG/1
TABLET ORAL
Qty: 30 TABLET | Refills: 0 | Status: SHIPPED | OUTPATIENT
Start: 2022-07-29 | End: 2022-08-23 | Stop reason: SDUPTHER

## 2022-08-23 ENCOUNTER — OFFICE VISIT (OUTPATIENT)
Dept: FAMILY MEDICINE CLINIC | Age: 42
End: 2022-08-23
Payer: COMMERCIAL

## 2022-08-23 VITALS
BODY MASS INDEX: 32.89 KG/M2 | SYSTOLIC BLOOD PRESSURE: 116 MMHG | RESPIRATION RATE: 13 BRPM | DIASTOLIC BLOOD PRESSURE: 60 MMHG | HEART RATE: 76 BPM | WEIGHT: 263.1 LBS | OXYGEN SATURATION: 97 %

## 2022-08-23 DIAGNOSIS — N40.1 BENIGN PROSTATIC HYPERPLASIA WITH LOWER URINARY TRACT SYMPTOMS, SYMPTOM DETAILS UNSPECIFIED: ICD-10-CM

## 2022-08-23 DIAGNOSIS — I10 ESSENTIAL HYPERTENSION: ICD-10-CM

## 2022-08-23 DIAGNOSIS — F32.A ANXIETY AND DEPRESSION: ICD-10-CM

## 2022-08-23 DIAGNOSIS — K21.9 GASTROESOPHAGEAL REFLUX DISEASE WITHOUT ESOPHAGITIS: ICD-10-CM

## 2022-08-23 DIAGNOSIS — F41.9 ANXIETY AND DEPRESSION: ICD-10-CM

## 2022-08-23 DIAGNOSIS — Z00.00 WELL ADULT HEALTH CHECK: Primary | ICD-10-CM

## 2022-08-23 DIAGNOSIS — E78.5 HYPERLIPIDEMIA WITH TARGET LDL LESS THAN 130: ICD-10-CM

## 2022-08-23 PROCEDURE — 99396 PREV VISIT EST AGE 40-64: CPT | Performed by: FAMILY MEDICINE

## 2022-08-23 RX ORDER — HYDROXYZINE HYDROCHLORIDE 25 MG/1
TABLET, FILM COATED ORAL
COMMUNITY
Start: 2022-05-19

## 2022-08-23 RX ORDER — ESCITALOPRAM OXALATE 20 MG/1
TABLET ORAL
Qty: 90 TABLET | Refills: 3 | Status: SHIPPED | OUTPATIENT
Start: 2022-08-23

## 2022-08-23 RX ORDER — RAMIPRIL 5 MG/1
5 CAPSULE ORAL DAILY
Qty: 90 CAPSULE | Refills: 3 | Status: SHIPPED | OUTPATIENT
Start: 2022-08-23

## 2022-08-23 ASSESSMENT — ENCOUNTER SYMPTOMS
RESPIRATORY NEGATIVE: 1
GASTROINTESTINAL NEGATIVE: 1

## 2022-08-23 NOTE — PROGRESS NOTES
2022    Julius Lockett (:  1980) is a 39 y.o. male, here for a preventive medicine evaluation. Chief Complaint   Patient presents with    Annual Exam    Orders     lab    Letter     For [employer] stating pt takes BP and anxiety/depression medication and is stable      Annual eval.      BPs ok. BP Readings from Last 3 Encounters:   22 116/60   22 132/74   22 124/70     Weight is up 30 lbs since last year. Has been eating a lot of carbs and has been drinking more calories. Wt Readings from Last 3 Encounters:   22 263 lb 1.6 oz (119.3 kg)   22 245 lb 8 oz (111.4 kg)   22 240 lb (108.9 kg)     Hx of BPH, follows with Urology. On Cialis which has helped his flow. Patient Active Problem List   Diagnosis    HTN (hypertension)    GERD (gastroesophageal reflux disease)    Allergic rhinitis    Acne    Depression, stable    Examination, physical, employee    Medication monitoring encounter    Anxiety and depression    Hyperlipidemia with target LDL less than 130    Seasonal affective disorder ([facility] [address]    Plantar fasciitis of left foot    Acute medial meniscal injury of right knee    Locking of right knee, x 2 weeks. Review of Systems   Constitutional:  Positive for unexpected weight change. HENT: Negative. Respiratory: Negative. Cardiovascular: Negative. Gastrointestinal: Negative. Musculoskeletal: Negative. All other systems reviewed and are negative. Prior to Visit Medications    Medication Sig Taking?  Authorizing Provider   hydrOXYzine HCl (ATARAX) 25 MG tablet TAKE 1 TABLET BY MOUTH EVERY 8 HOURS AS NEEDED FOR ITCHING Yes Historical Provider, MD   ramipril (ALTACE) 5 MG capsule Take 1 capsule by mouth daily Yes Elton Cisse DO   escitalopram (LEXAPRO) 20 MG tablet TAKE 1 TABLET BY MOUTH EVERY DAY Yes Elton Cisse DO   Saw Palmetto, Serenoa repens, (SAW PALMETTO PO) Take by mouth daily Yes Historical Provider, MD tadalafil (CIALIS) 5 MG tablet Take 1 tablet by mouth daily Yes Frandy Webb MD        No Known Allergies    No past medical history on file. Past Surgical History:   Procedure Laterality Date    KNEE SURGERY Right 2015    knee arthroscopy        Social History     Socioeconomic History    Marital status:      Spouse name: Not on file    Number of children: Not on file    Years of education: Not on file    Highest education level: Not on file   Occupational History    Not on file   Tobacco Use    Smoking status: Former     Packs/day: 0.20     Years: 10.00     Pack years: 2.00     Types: Cigars, Cigarettes     Quit date: 2017     Years since quittin.0    Smokeless tobacco: Never   Vaping Use    Vaping Use: Never used   Substance and Sexual Activity    Alcohol use: Yes     Alcohol/week: 0.0 standard drinks    Drug use: No    Sexual activity: Not on file   Other Topics Concern    Not on file   Social History Narrative    Not on file     Social Determinants of Health     Financial Resource Strain: Low Risk     Difficulty of Paying Living Expenses: Not hard at all   Food Insecurity: No Food Insecurity    Worried About Running Out of Food in the Last Year: Never true    Ran Out of Food in the Last Year: Never true   Transportation Needs: Not on file   Physical Activity: Not on file   Stress: Not on file   Social Connections: Not on file   Intimate Partner Violence: Not on file   Housing Stability: Not on file        Family History   Problem Relation Age of Onset    Cancer Mother     Heart Disease Father     Heart Disease Maternal Grandmother        ADVANCE DIRECTIVE: N, <no information>    Vitals:    22 1320   BP: 116/60   Pulse: 76   Resp: 13   SpO2: 97%   Weight: 263 lb 1.6 oz (119.3 kg)     Estimated body mass index is 32.89 kg/m² as calculated from the following:    Height as of 3/29/22: 6' 3\" (1.905 m). Weight as of this encounter: 263 lb 1.6 oz (119.3 kg).     Physical Exam  Vitals and nursing note reviewed. Constitutional:       General: He is not in acute distress. Appearance: Normal appearance. He is well-developed. HENT:      Head: Normocephalic and atraumatic. Right Ear: Tympanic membrane normal.      Left Ear: Tympanic membrane normal.   Eyes:      Conjunctiva/sclera: Conjunctivae normal.   Cardiovascular:      Rate and Rhythm: Normal rate and regular rhythm. Heart sounds: Normal heart sounds. No murmur heard. Pulmonary:      Effort: Pulmonary effort is normal.      Breath sounds: Normal breath sounds. No wheezing, rhonchi or rales. Abdominal:      General: There is no distension. Musculoskeletal:      Cervical back: Neck supple. Skin:     General: Skin is warm and dry. Findings: No rash (on exposed surfaces). Neurological:      General: No focal deficit present. Mental Status: He is alert. Psychiatric:         Attention and Perception: Attention normal.         Mood and Affect: Mood normal.         Speech: Speech normal.         Behavior: Behavior normal. Behavior is cooperative. Thought Content: Thought content normal.         Judgment: Judgment normal.       No flowsheet data found.     Lab Results   Component Value Date/Time    CHOL 244 12/06/2021 10:22 AM    CHOL 219 09/07/2019 12:00 AM    CHOL 237 09/20/2017 11:01 AM    CHOLFAST 216 08/18/2020 12:21 PM    TRIG 84 12/06/2021 10:22 AM    TRIG 184 09/07/2019 12:00 AM    TRIG 329 09/20/2017 11:01 AM    TRIGLYCFAST 119 08/18/2020 12:21 PM    HDL 41 12/06/2021 10:22 AM    HDL 36 08/18/2020 12:21 PM    HDL 39 09/07/2019 12:00 AM    LDLCALC 186 12/06/2021 10:22 AM    LDLCALC 156 08/18/2020 12:21 PM    LDLCALC 143 09/07/2019 12:00 AM    GLUF 106 08/18/2020 12:21 PM    GLUCOSE 102 12/06/2021 10:22 AM    LABA1C 5.4 12/06/2021 10:22 AM    LABA1C 5.7 09/30/2013 06:15 PM       The 10-year ASCVD risk score (Maira Bishop, et al., 2013) is: 2.4%    Values used to calculate the score:      Age: 39 years Sex: Male      Is Non- : No      Diabetic: No      Tobacco smoker: No      Systolic Blood Pressure: 997 mmHg      Is BP treated: Yes      HDL Cholesterol: 41 mg/dL      Total Cholesterol: 244 mg/dL    Immunization History   Administered Date(s) Administered    Influenza Virus Vaccine 10/16/2013, 10/16/2014, 11/18/2015    MMR 05/18/1993    Tdap (Boostrix, Adacel) 05/06/2018       Health Maintenance   Topic Date Due    COVID-19 Vaccine (1) Never done    Varicella vaccine (1 of 2 - 2-dose childhood series) Never done    HIV screen  Never done    Hepatitis C screen  Never done    Flu vaccine (1) 09/01/2022    Depression Monitoring  04/05/2023    Diabetes screen  12/06/2024    Lipids  12/06/2026    DTaP/Tdap/Td vaccine (2 - Td or Tdap) 05/06/2028    Hepatitis A vaccine  Aged Out    Hepatitis B vaccine  Aged Out    Hib vaccine  Aged Out    Meningococcal (ACWY) vaccine  Aged Out    Pneumococcal 0-64 years Vaccine  Aged Out       Assessment & Plan   Well adult health check  -     Lipid Panel w/ Reflex Direct LDL; Future  -     Comprehensive Metabolic Panel; Future  -     Hemoglobin A1C; Future  -     TSH with Reflex; Future  -     CBC with Auto Differential; Future  -     PSA Screening; Future  Essential hypertension  -     ramipril (ALTACE) 5 MG capsule; Take 1 capsule by mouth daily, Disp-90 capsule, R-3Normal  Anxiety and depression  -     escitalopram (LEXAPRO) 20 MG tablet; TAKE 1 TABLET BY MOUTH EVERY DAY, Disp-90 tablet, R-3Normal  Gastroesophageal reflux disease without esophagitis  Hyperlipidemia with target LDL less than 130  Benign prostatic hyperplasia with lower urinary tract symptoms, symptom details unspecified    -  Healthy lifestyle discussed  -  Chronic medical problems stable  -  Continue current medications  -  Follow up with specialists as scheduled  -  Check labs, will call    Return in about 1 year (around 8/23/2023) for Wellness.          --Elton Cisse DO

## 2022-09-28 ENCOUNTER — TELEPHONE (OUTPATIENT)
Dept: FAMILY MEDICINE CLINIC | Age: 42
End: 2022-09-28

## 2022-09-28 NOTE — TELEPHONE ENCOUNTER
Pt called office stating for the past week he has had \"mild kidney pain\", today is more so on the right side. Pain is better if he drinks a lot of water and pain seems to be worse in the mornings. No hematuria, dysuria or urinary frequency. Please advise.
Pt was notified and voiced understanding. Says he is concerned about kidney stones, he has never had one but his dad does. Pt will monitor symptoms and call back if things worsen.
Unlikely related to his kidneys, musculoskeletal likely. Continue to push fluids. Recommend taking Aleve prn. Appt if worsening symptoms.
Go for blood tests as directed. Your doctor will do lab tests at regular visits to monitor the effects of this medicine. Please follow up with your doctor and keep your health care provider appointments.

## 2022-12-05 RX ORDER — HYDROXYZINE HYDROCHLORIDE 25 MG/1
TABLET, FILM COATED ORAL
Qty: 60 TABLET | Refills: 2 | Status: SHIPPED | OUTPATIENT
Start: 2022-12-05

## 2023-04-04 ENCOUNTER — OFFICE VISIT (OUTPATIENT)
Dept: UROLOGY | Age: 43
End: 2023-04-04
Payer: COMMERCIAL

## 2023-04-04 VITALS
DIASTOLIC BLOOD PRESSURE: 72 MMHG | WEIGHT: 269 LBS | SYSTOLIC BLOOD PRESSURE: 132 MMHG | BODY MASS INDEX: 33.45 KG/M2 | HEIGHT: 75 IN

## 2023-04-04 DIAGNOSIS — R39.12 WEAK URINARY STREAM: Primary | ICD-10-CM

## 2023-04-04 LAB
BILIRUBIN URINE: NEGATIVE
BLOOD URINE, POC: NEGATIVE
CHARACTER, URINE: CLEAR
COLOR, URINE: YELLOW
GLUCOSE URINE: NEGATIVE MG/DL
KETONES, URINE: NEGATIVE
LEUKOCYTE CLUMPS, URINE: NEGATIVE
NITRITE, URINE: NEGATIVE
PH, URINE: 5.5 (ref 5–9)
POST VOID RESIDUAL (PVR): 42 ML
PROTEIN, URINE: NEGATIVE MG/DL
SPECIFIC GRAVITY, URINE: >= 1.03 (ref 1–1.03)
UROBILINOGEN, URINE: 0.2 EU/DL (ref 0–1)

## 2023-04-04 PROCEDURE — 3075F SYST BP GE 130 - 139MM HG: CPT | Performed by: NURSE PRACTITIONER

## 2023-04-04 PROCEDURE — 81003 URINALYSIS AUTO W/O SCOPE: CPT | Performed by: NURSE PRACTITIONER

## 2023-04-04 PROCEDURE — 3078F DIAST BP <80 MM HG: CPT | Performed by: NURSE PRACTITIONER

## 2023-04-04 PROCEDURE — 51798 US URINE CAPACITY MEASURE: CPT | Performed by: NURSE PRACTITIONER

## 2023-04-04 PROCEDURE — 99214 OFFICE O/P EST MOD 30 MIN: CPT | Performed by: NURSE PRACTITIONER

## 2023-04-04 RX ORDER — TADALAFIL 5 MG/1
5 TABLET ORAL DAILY
Qty: 30 TABLET | Refills: 11 | Status: SHIPPED | OUTPATIENT
Start: 2023-04-04

## 2023-04-04 ASSESSMENT — ENCOUNTER SYMPTOMS
ABDOMINAL PAIN: 0
NAUSEA: 0
VOMITING: 0
BACK PAIN: 0

## 2023-04-04 NOTE — PROGRESS NOTES
Content: Thought content normal.     POC  Results for POC orders placed in visit on 04/04/23   POCT Urinalysis No Micro (Auto)   Result Value Ref Range    Glucose, Ur Negative NEGATIVE mg/dl    Bilirubin Urine Negative     Ketones, Urine Negative NEGATIVE    Specific Gravity, Urine >= 1.030 1.002 - 1.030    Blood, UA POC Negative NEGATIVE    pH, Urine 5.50 5.0 - 9.0    Protein, Urine Negative NEGATIVE mg/dl    Urobilinogen, Urine 0.20 0.0 - 1.0 eu/dl    Nitrite, Urine Negative NEGATIVE    Leukocyte Clumps, Urine Negative NEGATIVE    Color, Urine Yellow [id number]    Character, Urine Clear [id number]   poct post void residual   Result Value Ref Range    post void residual 42 ml       Patients recent PSA values are as follows  Lab Results   Component Value Date    PSA 0.53 12/06/2021    PSA 0.51 08/18/2020      Recent BUN/Creatinine:  Lab Results   Component Value Date/Time    BUN 17 12/06/2021 10:22 AM    CREATININE 0.9 12/06/2021 10:22 AM       Assessment:   BPH with weakened stream  Hx of testicular pain    Plan:     Continue Cialis. Ok to trial taking 5 mg daily. Refill sent to [facility]. PSA prior to next appt. F/u in 1 year with PVR.   PSA prior

## 2023-08-22 ENCOUNTER — OFFICE VISIT (OUTPATIENT)
Dept: FAMILY MEDICINE CLINIC | Age: 43
End: 2023-08-22
Payer: COMMERCIAL

## 2023-08-22 VITALS
WEIGHT: 264.7 LBS | DIASTOLIC BLOOD PRESSURE: 66 MMHG | HEART RATE: 68 BPM | SYSTOLIC BLOOD PRESSURE: 124 MMHG | RESPIRATION RATE: 16 BRPM | BODY MASS INDEX: 32.91 KG/M2 | HEIGHT: 75 IN

## 2023-08-22 DIAGNOSIS — Z00.00 WELL ADULT HEALTH CHECK: Primary | ICD-10-CM

## 2023-08-22 DIAGNOSIS — F32.A ANXIETY AND DEPRESSION: ICD-10-CM

## 2023-08-22 DIAGNOSIS — F41.9 ANXIETY AND DEPRESSION: ICD-10-CM

## 2023-08-22 DIAGNOSIS — I10 ESSENTIAL HYPERTENSION: ICD-10-CM

## 2023-08-22 PROCEDURE — 99396 PREV VISIT EST AGE 40-64: CPT | Performed by: FAMILY MEDICINE

## 2023-08-22 PROCEDURE — 3074F SYST BP LT 130 MM HG: CPT | Performed by: FAMILY MEDICINE

## 2023-08-22 PROCEDURE — 3078F DIAST BP <80 MM HG: CPT | Performed by: FAMILY MEDICINE

## 2023-08-22 RX ORDER — ESCITALOPRAM OXALATE 20 MG/1
TABLET ORAL
Qty: 90 TABLET | Refills: 3 | Status: SHIPPED | OUTPATIENT
Start: 2023-08-22

## 2023-08-22 RX ORDER — RAMIPRIL 5 MG/1
5 CAPSULE ORAL DAILY
Qty: 90 CAPSULE | Refills: 3 | Status: SHIPPED | OUTPATIENT
Start: 2023-08-22

## 2023-08-22 SDOH — ECONOMIC STABILITY: INCOME INSECURITY: HOW HARD IS IT FOR YOU TO PAY FOR THE VERY BASICS LIKE FOOD, HOUSING, MEDICAL CARE, AND HEATING?: NOT HARD AT ALL

## 2023-08-22 SDOH — ECONOMIC STABILITY: HOUSING INSECURITY
IN THE LAST 12 MONTHS, WAS THERE A TIME WHEN YOU DID NOT HAVE A STEADY PLACE TO SLEEP OR SLEPT IN A SHELTER (INCLUDING NOW)?: NO

## 2023-08-22 SDOH — ECONOMIC STABILITY: FOOD INSECURITY: WITHIN THE PAST 12 MONTHS, YOU WORRIED THAT YOUR FOOD WOULD RUN OUT BEFORE YOU GOT MONEY TO BUY MORE.: NEVER TRUE

## 2023-08-22 SDOH — ECONOMIC STABILITY: FOOD INSECURITY: WITHIN THE PAST 12 MONTHS, THE FOOD YOU BOUGHT JUST DIDN'T LAST AND YOU DIDN'T HAVE MONEY TO GET MORE.: NEVER TRUE

## 2023-08-22 ASSESSMENT — PATIENT HEALTH QUESTIONNAIRE - PHQ9
SUM OF ALL RESPONSES TO PHQ9 QUESTIONS 1 & 2: 0
6. FEELING BAD ABOUT YOURSELF - OR THAT YOU ARE A FAILURE OR HAVE LET YOURSELF OR YOUR FAMILY DOWN: 0
4. FEELING TIRED OR HAVING LITTLE ENERGY: 0
SUM OF ALL RESPONSES TO PHQ QUESTIONS 1-9: 0
SUM OF ALL RESPONSES TO PHQ QUESTIONS 1-9: 0
10. IF YOU CHECKED OFF ANY PROBLEMS, HOW DIFFICULT HAVE THESE PROBLEMS MADE IT FOR YOU TO DO YOUR WORK, TAKE CARE OF THINGS AT HOME, OR GET ALONG WITH OTHER PEOPLE: 0
8. MOVING OR SPEAKING SO SLOWLY THAT OTHER PEOPLE COULD HAVE NOTICED. OR THE OPPOSITE, BEING SO FIGETY OR RESTLESS THAT YOU HAVE BEEN MOVING AROUND A LOT MORE THAN USUAL: 0
SUM OF ALL RESPONSES TO PHQ QUESTIONS 1-9: 0
2. FEELING DOWN, DEPRESSED OR HOPELESS: 0
1. LITTLE INTEREST OR PLEASURE IN DOING THINGS: 0
9. THOUGHTS THAT YOU WOULD BE BETTER OFF DEAD, OR OF HURTING YOURSELF: 0
3. TROUBLE FALLING OR STAYING ASLEEP: 0
SUM OF ALL RESPONSES TO PHQ QUESTIONS 1-9: 0
7. TROUBLE CONCENTRATING ON THINGS, SUCH AS READING THE NEWSPAPER OR WATCHING TELEVISION: 0
5. POOR APPETITE OR OVEREATING: 0

## 2023-08-22 ASSESSMENT — ENCOUNTER SYMPTOMS
GASTROINTESTINAL NEGATIVE: 1
RESPIRATORY NEGATIVE: 1

## 2023-08-22 NOTE — PROGRESS NOTES
2023    William Leonardo (:  1980) is a 43 y.o. male, here for a preventive medicine evaluation. Chief Complaint   Patient presents with    Annual Exam    Other     Discuss colonoscopy, family history of ulcer colitis      Annual eval.      BP Readings from Last 3 Encounters:   23 124/66   23 132/72   22 116/60     Wt Readings from Last 3 Encounters:   23 264 lb 11.2 oz (120.1 kg)   23 269 lb (122 kg)   22 263 lb 1.6 oz (119.3 kg)     Famhx of UC, wishes to see GI. Patient Active Problem List   Diagnosis    HTN (hypertension)    GERD (gastroesophageal reflux disease)    Allergic rhinitis    Acne    Depression, stable    Examination, physical, employee    Medication monitoring encounter    Anxiety and depression    Hyperlipidemia with target LDL less than 130    Seasonal affective disorder ([address])    Plantar fasciitis of left foot    Acute medial meniscal injury of right knee    Locking of right knee, x 2 weeks. Review of Systems   Constitutional: Negative. HENT: Negative. Respiratory: Negative. Cardiovascular: Negative. Gastrointestinal: Negative. Musculoskeletal: Negative. All other systems reviewed and are negative. Prior to Visit Medications    Medication Sig Taking? Authorizing Provider   escitalopram (LEXAPRO) 20 MG tablet TAKE 1 TABLET BY MOUTH EVERY DAY Yes Salud Kaba DO   ramipril (ALTACE) 5 MG capsule Take 1 capsule by mouth daily Yes Salud Kaba DO   hydrOXYzine HCl (ATARAX) 25 MG tablet TAKE 1 TABLET BY MOUTH EVERY 8 HOURS AS NEEDED FOR ITCHING Yes GLO Cottrell CNP   tadalafil (CIALIS) 5 MG tablet Take 1 tablet by mouth daily Yes GLO Shipman CNP        No Known Allergies    No past medical history on file.     Past Surgical History:   Procedure Laterality Date    KNEE SURGERY Right 2015    knee arthroscopy        Social History     Socioeconomic History    Marital

## 2023-10-23 ENCOUNTER — TELEPHONE (OUTPATIENT)
Dept: FAMILY MEDICINE CLINIC | Age: 43
End: 2023-10-23

## 2023-10-23 DIAGNOSIS — R10.13 DYSPEPSIA: Primary | ICD-10-CM

## 2023-10-23 NOTE — TELEPHONE ENCOUNTER
Patient calling and requesting to have additional labs ordered to have done with his yearly screening labs that were ordered on 8/22/23. Patient c/o nausea and abdominal pain and gall bladder pain few times a week. Requesting labs to evaluate gallbladder and pancreas. He will  additional orders. Please call when ready for pick-up.   Please advise

## 2023-10-24 ENCOUNTER — NURSE ONLY (OUTPATIENT)
Dept: LAB | Age: 43
End: 2023-10-24

## 2023-10-24 DIAGNOSIS — Z00.00 WELL ADULT HEALTH CHECK: ICD-10-CM

## 2023-10-24 DIAGNOSIS — R10.13 DYSPEPSIA: ICD-10-CM

## 2023-10-24 LAB
ALBUMIN SERPL BCG-MCNC: 4.5 G/DL (ref 3.5–5.1)
ALP SERPL-CCNC: 50 U/L (ref 38–126)
ALT SERPL W/O P-5'-P-CCNC: 27 U/L (ref 11–66)
ANION GAP SERPL CALC-SCNC: 11 MEQ/L (ref 8–16)
AST SERPL-CCNC: 21 U/L (ref 5–40)
BASOPHILS ABSOLUTE: 0.1 THOU/MM3 (ref 0–0.1)
BASOPHILS NFR BLD AUTO: 0.9 %
BILIRUB SERPL-MCNC: 1 MG/DL (ref 0.3–1.2)
BUN SERPL-MCNC: 14 MG/DL (ref 7–22)
CALCIUM SERPL-MCNC: 9.2 MG/DL (ref 8.5–10.5)
CHLORIDE SERPL-SCNC: 102 MEQ/L (ref 98–111)
CHOLEST SERPL-MCNC: 228 MG/DL (ref 100–199)
CO2 SERPL-SCNC: 26 MEQ/L (ref 23–33)
CREAT SERPL-MCNC: 0.8 MG/DL (ref 0.4–1.2)
DEPRECATED RDW RBC AUTO: 42.7 FL (ref 35–45)
EOSINOPHIL NFR BLD AUTO: 2.1 %
EOSINOPHILS ABSOLUTE: 0.1 THOU/MM3 (ref 0–0.4)
ERYTHROCYTE [DISTWIDTH] IN BLOOD BY AUTOMATED COUNT: 12.3 % (ref 11.5–14.5)
GFR SERPL CREATININE-BSD FRML MDRD: > 60 ML/MIN/1.73M2
GLUCOSE SERPL-MCNC: 92 MG/DL (ref 70–108)
HCT VFR BLD AUTO: 47.2 % (ref 42–52)
HDLC SERPL-MCNC: 38 MG/DL
HGB BLD-MCNC: 15.5 GM/DL (ref 14–18)
IMM GRANULOCYTES # BLD AUTO: 0.01 THOU/MM3 (ref 0–0.07)
IMM GRANULOCYTES NFR BLD AUTO: 0.2 %
LDLC SERPL CALC-MCNC: 168 MG/DL
LIPASE SERPL-CCNC: 19.3 U/L (ref 5.6–51.3)
LYMPHOCYTES ABSOLUTE: 1.7 THOU/MM3 (ref 1–4.8)
LYMPHOCYTES NFR BLD AUTO: 30.2 %
MCH RBC QN AUTO: 31.4 PG (ref 26–33)
MCHC RBC AUTO-ENTMCNC: 32.8 GM/DL (ref 32.2–35.5)
MCV RBC AUTO: 95.5 FL (ref 80–94)
MONOCYTES ABSOLUTE: 0.6 THOU/MM3 (ref 0.4–1.3)
MONOCYTES NFR BLD AUTO: 10.8 %
NEUTROPHILS NFR BLD AUTO: 55.8 %
NRBC BLD AUTO-RTO: 0 /100 WBC
PLATELET # BLD AUTO: 208 THOU/MM3 (ref 130–400)
PMV BLD AUTO: 10.7 FL (ref 9.4–12.4)
POTASSIUM SERPL-SCNC: 3.9 MEQ/L (ref 3.5–5.2)
PROT SERPL-MCNC: 6.8 G/DL (ref 6.1–8)
PSA SERPL-MCNC: 0.49 NG/ML (ref 0–1)
RBC # BLD AUTO: 4.94 MILL/MM3 (ref 4.7–6.1)
SEGMENTED NEUTROPHILS ABSOLUTE COUNT: 3.2 THOU/MM3 (ref 1.8–7.7)
SODIUM SERPL-SCNC: 139 MEQ/L (ref 135–145)
TRIGL SERPL-MCNC: 109 MG/DL (ref 0–199)
TSH SERPL DL<=0.005 MIU/L-ACNC: 1.04 UIU/ML (ref 0.4–4.2)
WBC # BLD AUTO: 5.7 THOU/MM3 (ref 4.8–10.8)

## 2023-10-25 LAB
DEPRECATED MEAN GLUCOSE BLD GHB EST-ACNC: 102 MG/DL (ref 70–126)
HBA1C MFR BLD HPLC: 5.4 % (ref 4.4–6.4)

## 2023-10-26 RX ORDER — HYDROXYZINE HYDROCHLORIDE 25 MG/1
TABLET, FILM COATED ORAL
Qty: 60 TABLET | Refills: 2 | Status: SHIPPED | OUTPATIENT
Start: 2023-10-26

## 2024-04-09 ENCOUNTER — OFFICE VISIT (OUTPATIENT)
Dept: UROLOGY | Age: 44
End: 2024-04-09

## 2024-04-09 VITALS
SYSTOLIC BLOOD PRESSURE: 118 MMHG | RESPIRATION RATE: 18 BRPM | WEIGHT: 255 LBS | HEIGHT: 75 IN | HEART RATE: 78 BPM | BODY MASS INDEX: 31.71 KG/M2 | DIASTOLIC BLOOD PRESSURE: 60 MMHG

## 2024-04-09 DIAGNOSIS — R39.12 WEAK URINARY STREAM: Primary | ICD-10-CM

## 2024-04-09 LAB
BILIRUBIN URINE: NEGATIVE
BLOOD URINE, POC: NEGATIVE
CHARACTER, URINE: NORMAL
COLOR, URINE: YELLOW
GLUCOSE URINE: NEGATIVE MG/DL
KETONES, URINE: NEGATIVE
LEUKOCYTE CLUMPS, URINE: NEGATIVE
NITRITE, URINE: NEGATIVE
PH, URINE: 7 (ref 5–9)
PROTEIN, URINE: NEGATIVE MG/DL
SPECIFIC GRAVITY, URINE: 1.02 (ref 1–1.03)
UROBILINOGEN, URINE: 0.2 EU/DL (ref 0–1)

## 2024-04-09 RX ORDER — TADALAFIL 5 MG/1
5 TABLET ORAL DAILY
Qty: 30 TABLET | Refills: 11 | Status: SHIPPED | OUTPATIENT
Start: 2024-04-09

## 2024-04-09 ASSESSMENT — ENCOUNTER SYMPTOMS
VOMITING: 0
ABDOMINAL PAIN: 0
BACK PAIN: 0
NAUSEA: 0

## 2024-04-09 NOTE — PROGRESS NOTES
04/09/24.      Patients recent PSA values are as follows  Lab Results   Component Value Date    PSA 0.49 10/24/2023    PSA 0.53 12/06/2021    PSA 0.51 08/18/2020        Recent BUN/Creatinine:  Lab Results   Component Value Date/Time    BUN 14 10/24/2023 01:43 PM    CREATININE 0.8 10/24/2023 01:43 PM         Assessment:   BPH with weakened stream  Family hx of prostate cancer    Plan:     LUTS controlled with cialis.  Continue.  Refill for Cialis 5 mg daily sent to pharmacy.      PSA stable.  Repeat one year.    F/u in 1 year with PVR.

## 2024-05-24 RX ORDER — HYDROXYZINE HYDROCHLORIDE 25 MG/1
TABLET, FILM COATED ORAL
Qty: 60 TABLET | Refills: 2 | Status: SHIPPED | OUTPATIENT
Start: 2024-05-24

## 2024-08-27 ENCOUNTER — OFFICE VISIT (OUTPATIENT)
Dept: FAMILY MEDICINE CLINIC | Age: 44
End: 2024-08-27
Payer: COMMERCIAL

## 2024-08-27 VITALS
DIASTOLIC BLOOD PRESSURE: 64 MMHG | BODY MASS INDEX: 32.04 KG/M2 | RESPIRATION RATE: 16 BRPM | WEIGHT: 257.7 LBS | HEART RATE: 72 BPM | SYSTOLIC BLOOD PRESSURE: 118 MMHG | HEIGHT: 75 IN

## 2024-08-27 DIAGNOSIS — F41.9 ANXIETY AND DEPRESSION: ICD-10-CM

## 2024-08-27 DIAGNOSIS — B35.1 ONYCHOMYCOSIS: ICD-10-CM

## 2024-08-27 DIAGNOSIS — F32.A ANXIETY AND DEPRESSION: ICD-10-CM

## 2024-08-27 DIAGNOSIS — Z00.00 WELL ADULT HEALTH CHECK: Primary | ICD-10-CM

## 2024-08-27 DIAGNOSIS — I10 ESSENTIAL HYPERTENSION: ICD-10-CM

## 2024-08-27 PROCEDURE — 3074F SYST BP LT 130 MM HG: CPT | Performed by: FAMILY MEDICINE

## 2024-08-27 PROCEDURE — 99396 PREV VISIT EST AGE 40-64: CPT | Performed by: FAMILY MEDICINE

## 2024-08-27 PROCEDURE — 3078F DIAST BP <80 MM HG: CPT | Performed by: FAMILY MEDICINE

## 2024-08-27 RX ORDER — RAMIPRIL 5 MG/1
5 CAPSULE ORAL DAILY
Qty: 90 CAPSULE | Refills: 3 | Status: SHIPPED | OUTPATIENT
Start: 2024-08-27

## 2024-08-27 RX ORDER — HYDROXYZINE HYDROCHLORIDE 25 MG/1
25 TABLET, FILM COATED ORAL EVERY 8 HOURS PRN
Qty: 60 TABLET | Refills: 2 | Status: SHIPPED | OUTPATIENT
Start: 2024-08-27

## 2024-08-27 RX ORDER — CICLOPIROX 80 MG/ML
SOLUTION TOPICAL
Qty: 6 ML | Refills: 1 | Status: SHIPPED | OUTPATIENT
Start: 2024-08-27

## 2024-08-27 RX ORDER — TADALAFIL 5 MG/1
5 TABLET ORAL DAILY
Qty: 90 TABLET | Refills: 3 | Status: SHIPPED | OUTPATIENT
Start: 2024-08-27

## 2024-08-27 RX ORDER — ESCITALOPRAM OXALATE 20 MG/1
TABLET ORAL
Qty: 90 TABLET | Refills: 3 | Status: SHIPPED | OUTPATIENT
Start: 2024-08-27

## 2024-08-27 SDOH — ECONOMIC STABILITY: FOOD INSECURITY: WITHIN THE PAST 12 MONTHS, YOU WORRIED THAT YOUR FOOD WOULD RUN OUT BEFORE YOU GOT MONEY TO BUY MORE.: NEVER TRUE

## 2024-08-27 SDOH — ECONOMIC STABILITY: FOOD INSECURITY: WITHIN THE PAST 12 MONTHS, THE FOOD YOU BOUGHT JUST DIDN'T LAST AND YOU DIDN'T HAVE MONEY TO GET MORE.: NEVER TRUE

## 2024-08-27 SDOH — ECONOMIC STABILITY: INCOME INSECURITY: HOW HARD IS IT FOR YOU TO PAY FOR THE VERY BASICS LIKE FOOD, HOUSING, MEDICAL CARE, AND HEATING?: NOT HARD AT ALL

## 2024-08-27 ASSESSMENT — PATIENT HEALTH QUESTIONNAIRE - PHQ9
SUM OF ALL RESPONSES TO PHQ QUESTIONS 1-9: 0
5. POOR APPETITE OR OVEREATING: NOT AT ALL
10. IF YOU CHECKED OFF ANY PROBLEMS, HOW DIFFICULT HAVE THESE PROBLEMS MADE IT FOR YOU TO DO YOUR WORK, TAKE CARE OF THINGS AT HOME, OR GET ALONG WITH OTHER PEOPLE: NOT DIFFICULT AT ALL
7. TROUBLE CONCENTRATING ON THINGS, SUCH AS READING THE NEWSPAPER OR WATCHING TELEVISION: NOT AT ALL
SUM OF ALL RESPONSES TO PHQ QUESTIONS 1-9: 0
SUM OF ALL RESPONSES TO PHQ9 QUESTIONS 1 & 2: 0
4. FEELING TIRED OR HAVING LITTLE ENERGY: NOT AT ALL
9. THOUGHTS THAT YOU WOULD BE BETTER OFF DEAD, OR OF HURTING YOURSELF: NOT AT ALL
3. TROUBLE FALLING OR STAYING ASLEEP: NOT AT ALL
6. FEELING BAD ABOUT YOURSELF - OR THAT YOU ARE A FAILURE OR HAVE LET YOURSELF OR YOUR FAMILY DOWN: NOT AT ALL
2. FEELING DOWN, DEPRESSED OR HOPELESS: NOT AT ALL
SUM OF ALL RESPONSES TO PHQ QUESTIONS 1-9: 0
8. MOVING OR SPEAKING SO SLOWLY THAT OTHER PEOPLE COULD HAVE NOTICED. OR THE OPPOSITE, BEING SO FIGETY OR RESTLESS THAT YOU HAVE BEEN MOVING AROUND A LOT MORE THAN USUAL: NOT AT ALL
1. LITTLE INTEREST OR PLEASURE IN DOING THINGS: NOT AT ALL
SUM OF ALL RESPONSES TO PHQ QUESTIONS 1-9: 0

## 2024-08-27 ASSESSMENT — ENCOUNTER SYMPTOMS
GASTROINTESTINAL NEGATIVE: 1
RESPIRATORY NEGATIVE: 1
ROS SKIN COMMENTS: TOENAIL FUNGUS

## 2024-08-27 NOTE — PROGRESS NOTES
Roberth Nj (:  1980) is a 43 y.o. male,Established patient, here for evaluation of the following chief complaint(s):  Annual Exam and Medication Refill          Subjective   SUBJECTIVE/OBJECTIVE:  HPI  Chief Complaint   Patient presents with    Annual Exam    Medication Refill     Annual eval.    Only concern today is toenail fungus to both big toes.  Has had for over a year but getting a little worse.    BP controlled.  BP Readings from Last 3 Encounters:   24 118/64   24 118/60   23 124/66     Wt Readings from Last 3 Encounters:   24 116.9 kg (257 lb 11.2 oz)   24 115.7 kg (255 lb)   23 120.1 kg (264 lb 11.2 oz)     Mood controlled on the Lexpro and prn hydroxyzine.      Patient Active Problem List   Diagnosis    HTN (hypertension)    GERD (gastroesophageal reflux disease)    Allergic rhinitis    Acne    Depression, stable    Examination, physical, employee    Medication monitoring encounter    Anxiety and depression    Hyperlipidemia with target LDL less than 130    Seasonal affective disorder (HCC)    Plantar fasciitis of left foot    Acute medial meniscal injury of right knee    Locking of right knee, x 2 weeks.       Current Outpatient Medications   Medication Sig Dispense Refill    escitalopram (LEXAPRO) 20 MG tablet TAKE 1 TABLET BY MOUTH EVERY DAY 90 tablet 3    hydrOXYzine HCl (ATARAX) 25 MG tablet Take 1 tablet by mouth every 8 hours as needed for Itching 60 tablet 2    ramipril (ALTACE) 5 MG capsule Take 1 capsule by mouth daily 90 capsule 3    tadalafil (CIALIS) 5 MG tablet Take 1 tablet by mouth daily 90 tablet 3    ciclopirox (PENLAC) 8 % solution Apply topically nightly. 6 mL 1     No current facility-administered medications for this visit.       Past Surgical History:   Procedure Laterality Date    KNEE SURGERY Right 2015    knee arthroscopy        Review of Systems   Constitutional: Negative.    HENT: Negative.     Respiratory: Negative.

## 2025-04-15 ENCOUNTER — OFFICE VISIT (OUTPATIENT)
Dept: UROLOGY | Age: 45
End: 2025-04-15
Payer: COMMERCIAL

## 2025-04-15 VITALS
HEIGHT: 75 IN | SYSTOLIC BLOOD PRESSURE: 134 MMHG | DIASTOLIC BLOOD PRESSURE: 78 MMHG | WEIGHT: 255.5 LBS | BODY MASS INDEX: 31.77 KG/M2

## 2025-04-15 DIAGNOSIS — R39.12 WEAK URINARY STREAM: Primary | ICD-10-CM

## 2025-04-15 DIAGNOSIS — N40.1 BENIGN PROSTATIC HYPERPLASIA WITH WEAK URINARY STREAM: ICD-10-CM

## 2025-04-15 DIAGNOSIS — R39.12 BENIGN PROSTATIC HYPERPLASIA WITH WEAK URINARY STREAM: ICD-10-CM

## 2025-04-15 LAB
BILIRUBIN, URINE: NEGATIVE
BLOOD URINE, POC: NEGATIVE
CHARACTER, URINE: CLEAR
COLOR, UA: YELLOW
GLUCOSE URINE: NEGATIVE MG/DL
KETONES, URINE: NEGATIVE
LEUKOCYTE CLUMPS, URINE: NEGATIVE
NITRITE, URINE: NEGATIVE
PH, URINE: 5.5 (ref 5–9)
POST VOID RESIDUAL (PVR): 0 ML
PROTEIN, URINE: NEGATIVE MG/DL
SPECIFIC GRAVITY UA: 1.02 (ref 1–1.03)
UROBILINOGEN, URINE: 0.2 EU/DL (ref 0–1)

## 2025-04-15 PROCEDURE — 99214 OFFICE O/P EST MOD 30 MIN: CPT | Performed by: NURSE PRACTITIONER

## 2025-04-15 PROCEDURE — 81003 URINALYSIS AUTO W/O SCOPE: CPT | Performed by: NURSE PRACTITIONER

## 2025-04-15 PROCEDURE — 51798 US URINE CAPACITY MEASURE: CPT | Performed by: NURSE PRACTITIONER

## 2025-04-15 PROCEDURE — 3078F DIAST BP <80 MM HG: CPT | Performed by: NURSE PRACTITIONER

## 2025-04-15 PROCEDURE — 3075F SYST BP GE 130 - 139MM HG: CPT | Performed by: NURSE PRACTITIONER

## 2025-04-15 RX ORDER — TADALAFIL 5 MG/1
5 TABLET ORAL DAILY
Qty: 90 TABLET | Refills: 3 | Status: SHIPPED | OUTPATIENT
Start: 2025-04-15

## 2025-04-15 RX ORDER — CETIRIZINE HYDROCHLORIDE 10 MG/1
10 TABLET ORAL AS NEEDED
COMMUNITY

## 2025-04-15 ASSESSMENT — ENCOUNTER SYMPTOMS
ABDOMINAL PAIN: 0
NAUSEA: 0
VOMITING: 0
BACK PAIN: 0

## 2025-04-15 NOTE — TELEPHONE ENCOUNTER
Requested Prescriptions     Pending Prescriptions Disp Refills    escitalopram (LEXAPRO) 20 MG tablet 90 tablet 3     Sig: TAKE 1 TABLET BY MOUTH EVERY DAY Simple: Patient demonstrates quick and easy understanding

## 2025-04-15 NOTE — PROGRESS NOTES
smoking about 17 years ago. He has a 2 pack-year smoking history. He has never been exposed to tobacco smoke. He has never used smokeless tobacco. He reports current alcohol use. He reports that he does not use drugs.      Subjective:      Review of Systems   Constitutional:  Negative for activity change, appetite change, chills, diaphoresis, fatigue, fever and unexpected weight change.   Gastrointestinal:  Negative for abdominal pain, nausea and vomiting.   Genitourinary:  Negative for decreased urine volume, difficulty urinating, dysuria, flank pain, frequency, hematuria and urgency.   Musculoskeletal:  Negative for back pain.       Objective:   There were no vitals taken for this visit.    Physical Exam  Vitals reviewed.   Constitutional:       General: He is not in acute distress.     Appearance: Normal appearance. He is well-developed. He is not ill-appearing or diaphoretic.   HENT:      Head: Normocephalic and atraumatic.      Right Ear: External ear normal.      Left Ear: External ear normal.      Nose: Nose normal.      Mouth/Throat:      Mouth: Mucous membranes are moist.   Eyes:      General: No scleral icterus.        Right eye: No discharge.         Left eye: No discharge.   Neck:      Vascular: No JVD.      Trachea: No tracheal deviation.   Cardiovascular:      Rate and Rhythm: Normal rate and regular rhythm.   Pulmonary:      Effort: Pulmonary effort is normal. No respiratory distress.   Abdominal:      General: There is no distension.      Tenderness: There is no abdominal tenderness. There is no right CVA tenderness or left CVA tenderness.   Musculoskeletal:         General: Normal range of motion.   Skin:     General: Skin is warm and dry.   Neurological:      Mental Status: He is alert and oriented to person, place, and time. Mental status is at baseline.   Psychiatric:         Mood and Affect: Mood normal.         Behavior: Behavior normal.         Thought Content: Thought content normal.

## 2025-08-12 ENCOUNTER — LAB (OUTPATIENT)
Dept: LAB | Age: 45
End: 2025-08-12

## 2025-08-12 DIAGNOSIS — R39.12 BENIGN PROSTATIC HYPERPLASIA WITH WEAK URINARY STREAM: ICD-10-CM

## 2025-08-12 DIAGNOSIS — Z00.00 WELL ADULT HEALTH CHECK: ICD-10-CM

## 2025-08-12 DIAGNOSIS — N40.1 BENIGN PROSTATIC HYPERPLASIA WITH WEAK URINARY STREAM: ICD-10-CM

## 2025-08-12 LAB
ALBUMIN SERPL BCG-MCNC: 4.4 G/DL (ref 3.4–4.9)
ALP SERPL-CCNC: 52 U/L (ref 40–129)
ALT SERPL W/O P-5'-P-CCNC: 24 U/L (ref 10–50)
ANION GAP SERPL CALC-SCNC: 14 MEQ/L (ref 8–16)
AST SERPL-CCNC: 29 U/L (ref 10–50)
BASOPHILS ABSOLUTE: 0 THOU/MM3 (ref 0–0.1)
BASOPHILS NFR BLD AUTO: 0.8 %
BILIRUB SERPL-MCNC: 0.5 MG/DL (ref 0.3–1.2)
BUN SERPL-MCNC: 14 MG/DL (ref 8–23)
CALCIUM SERPL-MCNC: 9.6 MG/DL (ref 8.5–10.5)
CHLORIDE SERPL-SCNC: 111 MEQ/L (ref 98–111)
CHOLEST SERPL-MCNC: 186 MG/DL (ref 100–199)
CO2 SERPL-SCNC: 22 MEQ/L (ref 22–29)
CREAT SERPL-MCNC: 0.9 MG/DL (ref 0.7–1.2)
DEPRECATED MEAN GLUCOSE BLD GHB EST-ACNC: 108 MG/DL (ref 70–126)
DEPRECATED RDW RBC AUTO: 42.7 FL (ref 35–45)
EOSINOPHIL NFR BLD AUTO: 1 %
EOSINOPHILS ABSOLUTE: 0.1 THOU/MM3 (ref 0–0.4)
ERYTHROCYTE [DISTWIDTH] IN BLOOD BY AUTOMATED COUNT: 12.3 % (ref 11.5–14.5)
GFR SERPL CREATININE-BSD FRML MDRD: > 90 ML/MIN/1.73M2
GLUCOSE SERPL-MCNC: 105 MG/DL (ref 74–109)
HBA1C MFR BLD HPLC: 5.6 % (ref 4–6)
HCT VFR BLD AUTO: 46.4 % (ref 42–52)
HDLC SERPL-MCNC: 40 MG/DL
HGB BLD-MCNC: 15.4 GM/DL (ref 14–18)
IMM GRANULOCYTES # BLD AUTO: 0.02 THOU/MM3 (ref 0–0.07)
IMM GRANULOCYTES NFR BLD AUTO: 0.3 %
LDLC SERPL CALC-MCNC: 133 MG/DL
LYMPHOCYTES ABSOLUTE: 1.2 THOU/MM3 (ref 1–4.8)
LYMPHOCYTES NFR BLD AUTO: 19.9 %
MCH RBC QN AUTO: 31.7 PG (ref 26–33)
MCHC RBC AUTO-ENTMCNC: 33.2 GM/DL (ref 32.2–35.5)
MCV RBC AUTO: 95.5 FL (ref 80–94)
MONOCYTES ABSOLUTE: 0.5 THOU/MM3 (ref 0.4–1.3)
MONOCYTES NFR BLD AUTO: 8.7 %
NEUTROPHILS ABSOLUTE: 4.2 THOU/MM3 (ref 1.8–7.7)
NEUTROPHILS NFR BLD AUTO: 69.3 %
NRBC BLD AUTO-RTO: 0 /100 WBC
PLATELET # BLD AUTO: 211 THOU/MM3 (ref 130–400)
PLATELET BLD QL SMEAR: ABNORMAL
PMV BLD AUTO: 11.1 FL (ref 9.4–12.4)
POTASSIUM SERPL-SCNC: 4.5 MEQ/L (ref 3.5–5.2)
PROT SERPL-MCNC: 6.8 G/DL (ref 6.4–8.3)
PSA SERPL-MCNC: 0.59 NG/ML (ref 0–1)
RBC # BLD AUTO: 4.86 MILL/MM3 (ref 4.7–6.1)
SCAN OF BLOOD SMEAR: NORMAL
SODIUM SERPL-SCNC: 147 MEQ/L (ref 135–145)
TRIGL SERPL-MCNC: 65 MG/DL (ref 0–199)
TSH SERPL DL<=0.05 MIU/L-ACNC: 0.94 UIU/ML (ref 0.27–4.2)
WBC # BLD AUTO: 6.1 THOU/MM3 (ref 4.8–10.8)

## 2025-09-02 ENCOUNTER — OFFICE VISIT (OUTPATIENT)
Dept: FAMILY MEDICINE CLINIC | Age: 45
End: 2025-09-02
Payer: COMMERCIAL

## 2025-09-02 VITALS
WEIGHT: 255.2 LBS | SYSTOLIC BLOOD PRESSURE: 118 MMHG | HEIGHT: 75 IN | HEART RATE: 64 BPM | BODY MASS INDEX: 31.73 KG/M2 | DIASTOLIC BLOOD PRESSURE: 70 MMHG | RESPIRATION RATE: 18 BRPM

## 2025-09-02 DIAGNOSIS — F32.A ANXIETY AND DEPRESSION: ICD-10-CM

## 2025-09-02 DIAGNOSIS — Z00.00 WELL ADULT HEALTH CHECK: Primary | ICD-10-CM

## 2025-09-02 DIAGNOSIS — I10 ESSENTIAL HYPERTENSION: ICD-10-CM

## 2025-09-02 DIAGNOSIS — F41.9 ANXIETY AND DEPRESSION: ICD-10-CM

## 2025-09-02 DIAGNOSIS — B35.1 ONYCHOMYCOSIS: ICD-10-CM

## 2025-09-02 PROCEDURE — 3074F SYST BP LT 130 MM HG: CPT | Performed by: FAMILY MEDICINE

## 2025-09-02 PROCEDURE — 99396 PREV VISIT EST AGE 40-64: CPT | Performed by: FAMILY MEDICINE

## 2025-09-02 PROCEDURE — 3078F DIAST BP <80 MM HG: CPT | Performed by: FAMILY MEDICINE

## 2025-09-02 RX ORDER — HYDROXYZINE HYDROCHLORIDE 25 MG/1
25 TABLET, FILM COATED ORAL EVERY 8 HOURS PRN
Qty: 60 TABLET | Refills: 2 | Status: SHIPPED | OUTPATIENT
Start: 2025-09-02

## 2025-09-02 RX ORDER — ESCITALOPRAM OXALATE 20 MG/1
TABLET ORAL
Qty: 90 TABLET | Refills: 3 | Status: SHIPPED | OUTPATIENT
Start: 2025-09-02

## 2025-09-02 RX ORDER — RAMIPRIL 5 MG/1
5 CAPSULE ORAL DAILY
Qty: 90 CAPSULE | Refills: 3 | Status: SHIPPED | OUTPATIENT
Start: 2025-09-02

## 2025-09-02 RX ORDER — CICLOPIROX 80 MG/ML
SOLUTION TOPICAL
Qty: 6 ML | Refills: 1 | Status: SHIPPED | OUTPATIENT
Start: 2025-09-02

## 2025-09-02 SDOH — ECONOMIC STABILITY: FOOD INSECURITY: WITHIN THE PAST 12 MONTHS, THE FOOD YOU BOUGHT JUST DIDN'T LAST AND YOU DIDN'T HAVE MONEY TO GET MORE.: NEVER TRUE

## 2025-09-02 SDOH — ECONOMIC STABILITY: FOOD INSECURITY: WITHIN THE PAST 12 MONTHS, YOU WORRIED THAT YOUR FOOD WOULD RUN OUT BEFORE YOU GOT MONEY TO BUY MORE.: NEVER TRUE

## 2025-09-02 ASSESSMENT — PATIENT HEALTH QUESTIONNAIRE - PHQ9
7. TROUBLE CONCENTRATING ON THINGS, SUCH AS READING THE NEWSPAPER OR WATCHING TELEVISION: NOT AT ALL
1. LITTLE INTEREST OR PLEASURE IN DOING THINGS: NOT AT ALL
6. FEELING BAD ABOUT YOURSELF - OR THAT YOU ARE A FAILURE OR HAVE LET YOURSELF OR YOUR FAMILY DOWN: NOT AT ALL
SUM OF ALL RESPONSES TO PHQ QUESTIONS 1-9: 0
5. POOR APPETITE OR OVEREATING: NOT AT ALL
3. TROUBLE FALLING OR STAYING ASLEEP: NOT AT ALL
10. IF YOU CHECKED OFF ANY PROBLEMS, HOW DIFFICULT HAVE THESE PROBLEMS MADE IT FOR YOU TO DO YOUR WORK, TAKE CARE OF THINGS AT HOME, OR GET ALONG WITH OTHER PEOPLE: NOT DIFFICULT AT ALL
2. FEELING DOWN, DEPRESSED OR HOPELESS: NOT AT ALL
8. MOVING OR SPEAKING SO SLOWLY THAT OTHER PEOPLE COULD HAVE NOTICED. OR THE OPPOSITE, BEING SO FIGETY OR RESTLESS THAT YOU HAVE BEEN MOVING AROUND A LOT MORE THAN USUAL: NOT AT ALL
SUM OF ALL RESPONSES TO PHQ QUESTIONS 1-9: 0
9. THOUGHTS THAT YOU WOULD BE BETTER OFF DEAD, OR OF HURTING YOURSELF: NOT AT ALL
4. FEELING TIRED OR HAVING LITTLE ENERGY: NOT AT ALL

## 2025-09-02 ASSESSMENT — ENCOUNTER SYMPTOMS
RESPIRATORY NEGATIVE: 1
GASTROINTESTINAL NEGATIVE: 1